# Patient Record
Sex: FEMALE | Race: WHITE | Employment: FULL TIME | URBAN - METROPOLITAN AREA
[De-identification: names, ages, dates, MRNs, and addresses within clinical notes are randomized per-mention and may not be internally consistent; named-entity substitution may affect disease eponyms.]

---

## 2022-12-30 LAB — HBA1C MFR BLD HPLC: 5.1 %

## 2023-01-30 ENCOUNTER — INITIAL PRENATAL (OUTPATIENT)
Dept: OBGYN CLINIC | Age: 33
End: 2023-01-30

## 2023-01-30 VITALS
HEIGHT: 62 IN | WEIGHT: 130 LBS | SYSTOLIC BLOOD PRESSURE: 132 MMHG | DIASTOLIC BLOOD PRESSURE: 89 MMHG | BODY MASS INDEX: 23.92 KG/M2

## 2023-01-30 DIAGNOSIS — Z80.3 FAMILY HISTORY OF BREAST CANCER: ICD-10-CM

## 2023-01-30 DIAGNOSIS — Z3A.08 8 WEEKS GESTATION OF PREGNANCY: Primary | ICD-10-CM

## 2023-01-30 PROBLEM — E03.9 HYPOTHYROID: Status: ACTIVE | Noted: 2023-01-30

## 2023-01-30 LAB
HEP B, EXTERNAL RESULT: NEGATIVE
HEPATITIS C ANTIBODY, EXTERNAL RESULT: NEGATIVE
HIV, EXTERNAL RESULT: NON REACTIVE
RPR, EXTERNAL RESULT: NON REACTIVE
RUBELLA TITER, EXTERNAL RESULT: NORMAL

## 2023-01-30 PROCEDURE — 0501F PRENATAL FLOW SHEET: CPT | Performed by: OBSTETRICS & GYNECOLOGY

## 2023-01-30 RX ORDER — LEVOTHYROXINE AND LIOTHYRONINE 57; 13.5 UG/1; UG/1
TABLET ORAL
COMMUNITY
Start: 2022-08-01

## 2023-01-30 NOTE — PROGRESS NOTES
Current pregnancy history:    Lashanda Damon is a ,  34 y.o. female [address] [facility],[address] Patient's last menstrual period was 2022. .  She presents for the evaluation of amenorrhea and a positive pregnancy test.  Last Pap: see report obtained . LMP history:  The date of her LMP is  certain. Her last menstrual period was normal and lasted for 4 to 5 days. A urine pregnancy test was positive 5 weeks ago. She was not on the pill at conception. Based on her LMP, her EDC is 2022 and her EGA is 8 weeks,4 days. Her menstrual cycles are regular and occur approximately every 28 days  and range from 3 to 5 days. The last menses lasted  the usual number of days. Ultrasound data:  She had an  ultrasound done by the ultrasound tech today which revealed a viable denis pregnancy with a gestational age of 11 weeks and 6 days giving an [address] of 2023. TA ULTRASOUND PERFORMED  A SINGLE VIABLE 8W6D IUP IS SEEN WITH NORMAL CARDIAC RHYTHM. GESTATIONAL AGE BASED ON TODAY'S ULTRASOUND. A NORMAL YOLK SAC IS SEEN. RIGHT OVARY NOT VISUALIZED. RIGHT ADNEXA APPEARS WITHIN NORMAL LIMITS. LEFT OVARY NOT VISUALIZED. LEFT APPEARS WITHIN NORMAL LIMITS. NO FREE FLUID SEEN IN THE CDS. Pregnancy symptoms:    Since her LMP she has experienced  urinary frequency, breast tenderness, and nausea. She has not been vomiting over the last few weeks. She is taking unisom at night with good results  Associated signs and symptoms which she denies: dysuria, discharge, vaginal bleeding. Relevant past pregnancy history:   She has the following pregnancy history: Her last pregnancy was an early loss   She has no history of  delivery. Relevant past medical history:(relevant to this pregnancy): noncontributory. Pap/Occupational history:  Last pap smear: last year Results: Normal      Her occupation is: .     Substance history: negative for alcohol, tobacco and street drugs. Positive for nothing. Exposure history: There is/are no indoor cat/s in the home. The patient was instructed to not change the cat litter. She admits close contact with children on a regular basis. She has had chicken pox or the vaccine in the past.   Patient denies issues with domestic violence. Genetic Screening/Teratology Counseling: (Includes patient, baby's father, or anyone in either family with:)  3.  Patient's age >/= 28 at [facility]?-- no  .   2. Thalassemia ([facility], [facility], [address] [facility] [address] [facility] [address], or  background): MCV<80?--no.     3.  Neural tube defect (meningomyelocele, spina bifida, anencephaly)?--no.   4.  Congenital heart defect?--no.  5.  Down syndrome?--no.   6.  Jaime-Sachs (Shinto, [place])?--no.   7.  Canavan's Disease?--no.   8.  Familial Dysautonomia?--no.   9.  Sickle cell disease or trait ()? --no   The patient has not been tested for sickle trait  10. Hemophilia or other blood disorders?--no. 11.  Muscular dystrophy?--no. 12.  Cystic fibrosis?--no. 13.  Morrow's Chorea?--no. 14.  Mental retardation/autism (if yes was person tested for Fragile X)?--no. 15.  Other inherited genetic or chromosomal disorder?--no. 12.  Maternal metabolic disorder (DM, PKU, etc)?--no. 17.  Patient or FOB with a child with a birth defect not listed above?--no.  17a. Patient or FOB with a birth defect themselves?--no. 18.  Recurrent pregnancy loss, or stillbirth?--no. 19.  Any medications since LMP other than prenatal vitamins (include vitamins, supplements, OTC meds, drugs, alcohol)?--no. 20.  Any other genetic/environmental exposure to discuss?--no. Infection History:  1. Lives with someone with TB or TB exposed?--no.   2.  Patient or partner has history of genital herpes?--no.  3.  Rash or viral illness since LMP?--no.    4.  History of STD (GC, CT, HPV, syphilis, HIV)? --no   5. Other: OTHER?       Past Medical History:   Diagnosis Date    Hypothyroid 2016     History reviewed. No pertinent surgical history. Social History     Occupational History    Not on file   Tobacco Use    Smoking status: Never    Smokeless tobacco: Not on file   Vaping Use    Vaping Use: Never used   Substance and Sexual Activity    Alcohol use: Not Currently     Alcohol/week: 4.0 standard drinks     Types: 4 Glasses of wine per week    Drug use: Never    Sexual activity: Yes     Partners: Male     Birth control/protection: None     Family History   Problem Relation Age of Onset    Breast Cancer Father             Heart Disease Maternal Grandfather     Colon Cancer Paternal Grandfather     Thyroid Disease Sister      OB History    Para Term  AB Living   2 0     1     SAB IAB Ectopic Molar Multiple Live Births   1                # Outcome Date GA Lbr Maksim/2nd Weight Sex Delivery Anes PTL Lv   2 Current            1 2020             Allergies   Allergen Reactions    Influenza Virus Vaccines Anaphylaxis    Azithromycin Nausea and Vomiting     Prior to Admission medications    Medication Sig Start Date End Date Taking? Authorizing Provider   thyroid, Pork, (ARMOUR) 90 mg tablet NP Thyroid 90 mg tablet   TAKE 1 TABLET BY MOUTH EVERY DAY IN THE MORNING ON EMPTY STOMACH 22  Yes Provider, Historical   prenatal vit no.124/iron/folic (PRENATAL VITAMIN PO) Take  by mouth.    Yes Provider, Historical        Review of Systems: History obtained from the patient  Constitutional: negative for weight loss, fever, night sweats  HEENT: negative for hearing loss, earache, congestion, snoring, sore throat  CV: negative for chest pain, palpitations, edema  Resp: negative for cough, shortness of breath, wheezing  Breast: negative for breast lumps, nipple discharge, galactorrhea  GI: negative for change in bowel habits, abdominal pain, black or bloody stools  : negative for frequency, dysuria, hematuria, vaginal discharge  MSK: negative for back pain, joint pain, muscle pain  Skin: negative for itching, rash, hives  Neuro: negative for dizziness, headache, confusion, weakness  Psych: negative for anxiety, depression, change in mood  Heme/lymph: negative for bleeding, bruising, pallor    Objective:  Visit Vitals  /89   Wt 130 lb (59 kg)   LMP 12/01/2022       Physical Exam:     Constitutional  Appearance: well-nourished, well developed, alert, in no acute distress    HENT  Head  Face: appears normal  Eyes: appear normal  Ears: normal  Mouth: normal  Lips: no lesions    Skin  General Inspection: no rash, no lesions identified    Neurologic/Psychiatric  Mental Status:  Orientation: grossly oriented to person, place and time  Mood and Affect: mood normal, affect appropriate    Assessment:     ICD-10-CM ICD-9-CM    1. 8 weeks gestation of pregnancy  Z3A.08 V22.2 TYPE & SCREEN      HEP B SURFACE AG      HIV 1/2 AG/AB, 4TH GENERATION,W RFLX CONFIRM      CBC W/O DIFF      RUBELLA AB, IGG      RPR      HEPATITIS C AB        Intrauterine pregnancy with the following problems identified:   Dating LMP = 8 wk   Hypothyroid    Plan:   Discussed NIPT  Course of pregnancy discussed including visit schedule, routine U/S, glucola testing, etc.  Avoid alcoholic beverages and illicit/recreational drugs use  Take prenatal vitamins or folic acid daily. Hospital and practice style discussed with coverage system. Discussed nutrition, toxoplasmosis precautions, sexual activity, exercise, need for influenza vaccine, environmental and work hazards, travel advice, screen for domestic violence, need for seat belts. Discussed seafood, unpasteurized dairy products, deli meat, artificial sweeteners, and caffeine. Information on prenatal classes/breastfeeding given. Information on circumcision given  Patient encouraged not to smoke. Discussed current prescription drug use. Given medication list.  Discussed the use of over the counter medications and chemicals.   Route of delivery discussed, including risks, benefits  Pt understands risk of hemorrhage during pregnancy and post delivery and would accept blood products if necessary in life-threatening emergencies    Handouts given to pt. Return in about 4 weeks (around 2/27/2023) for Routine OB Visit.

## 2023-01-30 NOTE — PROGRESS NOTES
Alejandro Townsend is a 34 y.o. female presents for a new pregnancy visit. Chief Complaint   Patient presents with    Initial Prenatal Visit         No LMP recorded. Last Pap: see report obtained . LMP history:  The date of her LMP is  certain. Her last menstrual period was normal and lasted for 4 to 5 days. A urine pregnancy test was positive 5 weeks ago. She was not on the pill at conception. Based on her LMP, her EDC is 2022 and her EGA is 8 weeks,4 days. Her menstrual cycles are regular and occur approximately every 28 days  and range from 3 to 5 days. The last menses lasted  the usual number of days. Ultrasound data:  She had an  ultrasound done by the ultrasound tech today which revealed a viable denis pregnancy with a gestational age of 11 weeks and 6 days giving an [address] of 2023. TA ULTRASOUND PERFORMED  A SINGLE VIABLE 8W6D IUP IS SEEN WITH NORMAL CARDIAC RHYTHM. GESTATIONAL AGE BASED ON TODAY'S ULTRASOUND. A NORMAL YOLK SAC IS SEEN. RIGHT OVARY NOT VISUALIZED. RIGHT ADNEXA APPEARS WITHIN NORMAL LIMITS. LEFT OVARY NOT VISUALIZED. LEFT APPEARS WITHIN NORMAL LIMITS. NO FREE FLUID SEEN IN THE CDS. Pregnancy symptoms:    Since her LMP she has experienced  urinary frequency, breast tenderness, and nausea. She has been vomiting over the last few weeks. Associated signs and symptoms which she denies: dysuria, discharge, vaginal bleeding. She states she has gained weight:  Approximately 5 pounds over the last few weeks. Relevant past pregnancy history:   She has the following pregnancy history:     She has no history of  delivery. Relevant past medical history:(relevant to this pregnancy): noncontributory. Her occupation is: [employer]. 1. Have you been to the ER, urgent care clinic, or hospitalized since your last visit? N/A NP    2.  Have you seen or consulted any other health care providers outside of the [address] [place] [address] since your last visit?   N/A NP     Examination chaperoned by Yanira Gallardo MA.

## 2023-02-01 LAB
ABO GROUP BLD: NORMAL
BLD GP AB SCN SERPL QL: NEGATIVE
ERYTHROCYTE [DISTWIDTH] IN BLOOD BY AUTOMATED COUNT: 11.4 % (ref 11.7–15.4)
HBV SURFACE AG SERPL QL IA: NEGATIVE
HCT VFR BLD AUTO: 37 % (ref 34–46.6)
HCV AB S/CO SERPL IA: <0.1 S/CO RATIO (ref 0–0.9)
HGB BLD-MCNC: 12.4 G/DL (ref 11.1–15.9)
HIV 1+2 AB+HIV1 P24 AG SERPL QL IA: NON REACTIVE
MCH RBC QN AUTO: 30.6 PG (ref 26.6–33)
MCHC RBC AUTO-ENTMCNC: 33.5 G/DL (ref 31.5–35.7)
MCV RBC AUTO: 91 FL (ref 79–97)
PLATELET # BLD AUTO: 315 X10E3/UL (ref 150–450)
RBC # BLD AUTO: 4.05 X10E6/UL (ref 3.77–5.28)
RH BLD: POSITIVE
RPR SER QL: NON REACTIVE
RUBV IGG SERPL IA-ACNC: 1.21 INDEX
WBC # BLD AUTO: 11.7 X10E3/UL (ref 3.4–10.8)

## 2023-02-15 ENCOUNTER — PATIENT MESSAGE (OUTPATIENT)
Dept: OBGYN CLINIC | Age: 33
End: 2023-02-15

## 2023-02-15 ENCOUNTER — TELEPHONE (OUTPATIENT)
Dept: OBGYN CLINIC | Age: 33
End: 2023-02-15

## 2023-02-15 NOTE — TELEPHONE ENCOUNTER
34 year ikd  10w6d Pregnant    Patient calling to say that she has [facility] to [facility] for possible uti and would like to see ob Md    Last urine culture came back for strep. .. and patient is wondering about treatment and would like ob to follow     Patient reports history of positive hsv and is wondering if that is going on       Patient placed on the schedule to be seen tomorrow at 4:00pm    Patient verbalized understanding

## 2023-02-16 ENCOUNTER — ROUTINE PRENATAL (OUTPATIENT)
Dept: OBGYN CLINIC | Age: 33
End: 2023-02-16
Payer: COMMERCIAL

## 2023-02-16 VITALS — BODY MASS INDEX: 23.7 KG/M2 | SYSTOLIC BLOOD PRESSURE: 142 MMHG | WEIGHT: 129.6 LBS | DIASTOLIC BLOOD PRESSURE: 88 MMHG

## 2023-02-16 DIAGNOSIS — R82.71 GROUP B STREPTOCOCCAL BACTERIURIA: ICD-10-CM

## 2023-02-16 DIAGNOSIS — L72.3 SEBACEOUS CYST: ICD-10-CM

## 2023-02-16 DIAGNOSIS — N30.00 ACUTE CYSTITIS WITHOUT HEMATURIA: Primary | ICD-10-CM

## 2023-02-16 DIAGNOSIS — B37.31 CANDIDA VAGINITIS: ICD-10-CM

## 2023-02-16 PROCEDURE — 99213 OFFICE O/P EST LOW 20 MIN: CPT | Performed by: OBSTETRICS & GYNECOLOGY

## 2023-02-16 RX ORDER — AMOXICILLIN 500 MG/1
CAPSULE ORAL
COMMUNITY
Start: 2023-02-10

## 2023-02-16 RX ORDER — AMOXICILLIN 500 MG/1
TABLET, FILM COATED ORAL
COMMUNITY
Start: 2023-02-15

## 2023-02-16 RX ORDER — CLOTRIMAZOLE AND BETAMETHASONE DIPROPIONATE 10; .64 MG/G; MG/G
CREAM TOPICAL
Qty: 15 G | Refills: 0 | Status: SHIPPED | OUTPATIENT
Start: 2023-02-16 | End: 2023-02-26

## 2023-02-16 RX ORDER — METRONIDAZOLE 7.5 MG/G
GEL VAGINAL
COMMUNITY
Start: 2023-02-03

## 2023-02-16 RX ORDER — LEVOTHYROXINE, LIOTHYRONINE 19; 4.5 UG/1; UG/1
TABLET ORAL
COMMUNITY
Start: 2023-02-03

## 2023-02-16 NOTE — PROGRESS NOTES
VAGINITIS EVALUATION    Chief Complaint   Pregnancy Problem    HPI  34 y.o.  female complains of UTI and vaginal irritation. Also small bump on right labia. Denies discharge. Was seen at [facility] and had urine culture that grew out GBS (50k). Was given amoxicillin but didn't take it yet. No VB. Past Medical History:  Patient Active Problem List   Diagnosis Code    Acquired hypothyroidism E03.9    Family history of breast cancer Z80.3    Group B streptococcal bacteriuria R82.71    Genital herpes A60.00       Past Medical History:   Diagnosis Date    Genital herpes     Group B streptococcal bacteriuria 2023    Hypothyroidism 2016     History reviewed. No pertinent surgical history.   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   Tobacco Use    Smoking status: Never    Smokeless tobacco: Never   Vaping Use    Vaping Use: Never used   Substance and Sexual Activity    Alcohol use: Not Currently     Alcohol/week: 4.0 standard drinks     Types: 4 Glasses of wine per week    Drug use: Never    Sexual activity: Yes     Partners: Male     Birth control/protection: None      Family History   Problem Relation Age of Onset    Breast Cancer Father 48           Was not brca tested    Thyroid Disease Sister     Heart Disease Maternal Grandfather     Colon Cancer Paternal Grandfather      Current Outpatient Medications on File Prior to Visit   Medication Sig Dispense Refill    amoxicillin (AMOXIL) 500 mg capsule       amoxicillin (AMOXIL) 500 mg tablet       metroNIDAZOLE (METROGEL) 0.75 % (37.5mg/5 gram) gel       NP Thyroid 30 mg tablet       thyroid, Pork, (ARMOUR) 90 mg tablet NP Thyroid 90 mg tablet   TAKE 1 TABLET BY MOUTH EVERY DAY IN THE MORNING ON EMPTY STOMACH      prenatal vit no.124/iron/folic (PRENATAL VITAMIN PO) Take  by mouth. No current facility-administered medications on file prior to visit. Allergies   Allergen Reactions    Influenza Virus Vaccines Anaphylaxis    Azithromycin Nausea and Vomiting     Other reaction(s): GI Intolerance       Review of Systems:   History obtained from the patient-negative for:  Constitutional: weight loss, fever, night sweats  HEENT: hearing loss, earache, congestion, snoring, sorethroat  CV: chest pain, palpitations, edema  Resp: cough, shortness of breath, wheezing  Breast: breast lumps, nipple discharge, galactorrhea  GI: change in bowel habits, abdominal pain, black or bloody stools  : frequency, dysuria, hematuria  MSK: back pain, joint pain, muscle pain  Skin: itching, rash, hives  Neuro: dizziness, headache, confusion, weakness  Psych: anxiety, depression, change in mood  Heme/lymph: bleeding, bruising, pallor    Objective:  Visit Vitals  BP (!) 142/88   Wt 129 lb 9.6 oz (58.8 kg)   LMP 12/01/2022   BMI 23.70 kg/m²       Physical Exam:  Constitutional  Appearance: well-nourished, well developed, alert, in no acute distress    HENT  Head and Face: appears normal    Genitourinary  External Genitalia: normal appearance for age, no discharge present, no tenderness present, a small sebaceous cyst is noted on the labia majora on the right. Not  inflamed. No other lesions present, no masses present, no atrophy present  Vagina:  no discharge present, otherwise normal vaginal vault without central or paravaginal defects, inflammation around posterior fourchette & perineum.  No  lesions present, no masses present  Bladder: non-tender to palpation  Urethra: appears normal  Cervix: normal   Uterus: normal size, shape and consistency  Adnexa: no adnexal tenderness present, no adnexal masses present  Perineum: perineum within normal limits, no evidence of trauma, no rashes or skin lesions present  Anus: anus within normal limits, no hemorrhoids present  Inguinal Lymph Nodes: no lymphadenopathy present    Skin  General Inspection: no rash, no lesions identified    Neurologic/Psychiatric  Mental Status:  Orientation: grossly oriented to person, place and time  Mood and Affect: mood normal, affect appropriate    Labs:  No results found for this or any previous visit (from the past 24 hour(s)). Assessment:     ICD-10-CM ICD-9-CM    1. Acute cystitis without hematuria  N30.00 595.0       2. Group B streptococcal bacteriuria  R82.71 599.0      041.02       3. Candida vaginitis  B37.31 112.1           Plan:   Treatment:  Lotrisone  Take Amoxicillin  Return in about 12 days (around 2/28/2023) for Routine OB Visit. ROV prn if symptoms persist or worsen.

## 2023-02-16 NOTE — PROGRESS NOTES
Patient states she was seen at [address] 2/7/2023 did vaginitis panel negative also seen at [facility] 2/10/2023 did urine culture. Patient has results. Patient states there is a boil?in her vaginal area.

## 2023-02-21 ENCOUNTER — ROUTINE PRENATAL (OUTPATIENT)
Dept: OBGYN CLINIC | Age: 33
End: 2023-02-21
Payer: COMMERCIAL

## 2023-02-21 VITALS
SYSTOLIC BLOOD PRESSURE: 122 MMHG | DIASTOLIC BLOOD PRESSURE: 85 MMHG | WEIGHT: 130.8 LBS | BODY MASS INDEX: 24.07 KG/M2 | HEIGHT: 62 IN

## 2023-02-21 DIAGNOSIS — N94.89 VULVAR BURNING: Primary | ICD-10-CM

## 2023-02-21 PROCEDURE — 99213 OFFICE O/P EST LOW 20 MIN: CPT | Performed by: OBSTETRICS & GYNECOLOGY

## 2023-02-21 NOTE — PROGRESS NOTES
VAGINITIS EVALUATION    Chief Complaint   Pregnancy Problem    HPI  34 y.o.  female complains of vulvar burning. She has been on amoxicillin for strept viridans but almost done. Was given Rx for Lotrisone at last visit but it burned to use so she stopped. Denies Vaginal discharge. States burning every where labia, periclitoral, perineum. No specific lesions. Is also taking valtrex just to be sure. Past Medical History:  Patient Active Problem List   Diagnosis Code    Acquired hypothyroidism E03.9    Family history of breast cancer Z80.3    Group B streptococcal bacteriuria R82.71    Genital herpes A60.00     Past Medical History:   Diagnosis Date    Genital herpes     had a few minor outbreaks and never after    Group B streptococcal bacteriuria 2023    Hypothyroidism 2016     History reviewed. No pertinent surgical history.   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   Tobacco Use    Smoking status: Never    Smokeless tobacco: Never   Vaping Use    Vaping Use: Never used   Substance and Sexual Activity    Alcohol use: Not Currently     Alcohol/week: 4.0 standard drinks     Types: 4 Glasses of wine per week    Drug use: Never    Sexual activity: Yes     Partners: Male     Birth control/protection: None      Family History   Problem Relation Age of Onset    Breast Cancer Father 48           Was not brca tested    Thyroid Disease Sister     Heart Disease Maternal Grandfather     Colon Cancer Paternal Grandfather      Current Outpatient Medications on File Prior to Visit   Medication Sig Dispense Refill    amoxicillin (AMOXIL) 500 mg capsule       amoxicillin (AMOXIL) 500 mg tablet       metroNIDAZOLE (METROGEL) 0.75 % (37.5mg/5 gram) gel       NP Thyroid 30 mg tablet       clotrimazole-betamethasone (LOTRISONE) topical cream Apply to affected areas externally bid. 15 g 0    thyroid, Pork, (ARMOUR) 90 mg tablet NP Thyroid 90 mg tablet   TAKE 1 TABLET BY MOUTH EVERY DAY IN THE MORNING ON EMPTY STOMACH      prenatal vit no.124/iron/folic (PRENATAL VITAMIN PO) Take  by mouth. No current facility-administered medications on file prior to visit.      Allergies   Allergen Reactions    Influenza Virus Vaccines Anaphylaxis    Azithromycin Nausea and Vomiting     Other reaction(s): GI Intolerance       Review of Systems:   History obtained from the patient-negative for:  Constitutional: weight loss, fever, night sweats  HEENT: hearing loss, earache, congestion, snoring, sorethroat  CV: chest pain, palpitations, edema  Resp: cough, shortness of breath, wheezing  Breast: breast lumps, nipple discharge, galactorrhea  GI: change in bowel habits, abdominal pain, black or bloody stools  : frequency, dysuria, hematuria  MSK: back pain, joint pain, muscle pain  Skin: itching, rash, hives  Neuro: dizziness, headache, confusion, weakness  Psych: anxiety, depression, change in mood  Heme/lymph: bleeding, bruising, pallor    Objective:  Visit Vitals  /85   Ht 5' 2\" (1.575 m)   Wt 130 lb 12.8 oz (59.3 kg)   LMP 12/01/2022   BMI 23.92 kg/m²       Physical Exam:  Constitutional  Appearance: well-nourished, well developed, alert, in no acute distress    HENT  Head and Face: appears normal    Genitourinary  External Genitalia: normal appearance for age, no discharge present, no tenderness present, no inflammatory lesions present, no masses present, no atrophy present  Vagina:  no discharge present, otherwise normal vaginal vault without central or paravaginal defects, no inflammatory lesions present, no masses present  Bladder: non-tender to palpation  Urethra: appears normal  Cervix: normal   Uterus: normal size, shape and consistency  Adnexa: no adnexal tenderness present, no adnexal masses present  Perineum: perineum within normal limits, no evidence of trauma, no rashes or skin lesions present  Anus: anus within normal limits, no hemorrhoids present  Inguinal Lymph Nodes: no lymphadenopathy present    Skin  General Inspection: no rash, no lesions identified    Neurologic/Psychiatric  Mental Status:  Orientation: grossly oriented to person, place and time  Mood and Affect: mood normal, affect appropriate    Labs:  Urine Dip negative. Wet Prep -- neg clues, wbc, trich or yeast on KOH    Assessment:     ICD-10-CM ICD-9-CM    1. Vulvar burning  N94.89 625.9 CT/NG/T.VAGINALIS AMPLIFICATION          Plan:   Treatment:  A&D ointment  Return in about 1 week (around 2/28/2023) for Routine OB Visit. ROV prn if symptoms persist or worsen.

## 2023-02-28 ENCOUNTER — ROUTINE PRENATAL (OUTPATIENT)
Dept: OBGYN CLINIC | Age: 33
End: 2023-02-28
Payer: COMMERCIAL

## 2023-02-28 VITALS — WEIGHT: 131.2 LBS | DIASTOLIC BLOOD PRESSURE: 83 MMHG | BODY MASS INDEX: 24 KG/M2 | SYSTOLIC BLOOD PRESSURE: 125 MMHG

## 2023-02-28 DIAGNOSIS — Z3A.12 12 WEEKS GESTATION OF PREGNANCY: Primary | ICD-10-CM

## 2023-02-28 PROCEDURE — 0502F SUBSEQUENT PRENATAL CARE: CPT | Performed by: OBSTETRICS & GYNECOLOGY

## 2023-02-28 NOTE — PROGRESS NOTES
OB VISIT      Current EGA:   12w5d    Visit Notes:  Doing Well. +Fetal Movement. No Ucs. No LOF or VB. For NIPT & Horizon today  Total weight gain is 1 lb 3.2 oz (0.544 kg). Problem List:  Dating LMP = 8 wk US  Hypothyroid  GBS Pos urcx  HSV2 +  Valtrex 35w    Visit Diagnoses:    ICD-10-CM ICD-9-CM    1. 12 weeks gestation of pregnancy  Z3A.12 V22.2 MISC. LAB TEST      MISC. LAB TEST        Follow Up:  Return in about 4 weeks (around 3/28/2023) for Routine OB Visit.     Lauri Lo MD  02/28/23  9:37 AM

## 2023-03-28 ENCOUNTER — ROUTINE PRENATAL (OUTPATIENT)
Dept: OBGYN CLINIC | Age: 33
End: 2023-03-28
Payer: COMMERCIAL

## 2023-03-28 VITALS — DIASTOLIC BLOOD PRESSURE: 75 MMHG | WEIGHT: 138.8 LBS | BODY MASS INDEX: 25.39 KG/M2 | SYSTOLIC BLOOD PRESSURE: 116 MMHG

## 2023-03-28 DIAGNOSIS — Z3A.16 16 WEEKS GESTATION OF PREGNANCY: Primary | ICD-10-CM

## 2023-03-28 PROCEDURE — 0502F SUBSEQUENT PRENATAL CARE: CPT | Performed by: OBSTETRICS & GYNECOLOGY

## 2023-03-28 RX ORDER — LEVOTHYROXINE, LIOTHYRONINE 76; 18 UG/1; UG/1
TABLET ORAL
COMMUNITY
Start: 2023-03-01

## 2023-03-28 NOTE — PROGRESS NOTES
OB VISIT      Current EGA:   16w5d    Visit Notes:  Doing Well. No LOF or VB. Total weight gain is 8 lb 12.8 oz (3.992 kg). Problem List:  Dating LMP = 8 wk US  Hypothyroid  GBS Pos urcx  HSV2 +  Valtrex 35w  NIPT WNL  Girl     Visit Diagnoses:    ICD-10-CM ICD-9-CM    1. 16 weeks gestation of pregnancy  Z3A.16 V22.2 AFP, MATERNAL SCREEN        Follow Up:  Return in about 1 month (around 4/28/2023) for Routine OB Visit, OB Ultrasound.     Junie Moore MD  03/28/23  9:30 AM

## 2023-03-30 LAB
AFP INTERP SERPL-IMP: NORMAL
AFP INTERP SERPL-IMP: NORMAL
AFP MOM SERPL: 0.95
AFP SERPL-MCNC: 34.8 NG/ML
AGE AT DELIVERY: 34 YR
COMMENT, 018013: NORMAL
GA METHOD: NORMAL
GA: 16 WEEKS
IDDM PATIENT QL: NO
MULTIPLE PREGNANCY: NO
NEURAL TUBE DEFECT RISK FETUS: NORMAL %
RESULTS, 017004: NORMAL

## 2023-04-17 ENCOUNTER — PATIENT MESSAGE (OUTPATIENT)
Dept: OBGYN CLINIC | Age: 33
End: 2023-04-17

## 2023-04-17 DIAGNOSIS — N76.0 BV (BACTERIAL VAGINOSIS): Primary | ICD-10-CM

## 2023-04-17 DIAGNOSIS — B96.89 BV (BACTERIAL VAGINOSIS): Primary | ICD-10-CM

## 2023-04-17 RX ORDER — VALACYCLOVIR HYDROCHLORIDE 500 MG/1
500 TABLET, FILM COATED ORAL 2 TIMES DAILY
Qty: 20 TABLET | Refills: 3 | Status: SHIPPED | OUTPATIENT
Start: 2023-04-17 | End: 2023-04-27

## 2023-04-19 RX ORDER — METRONIDAZOLE 500 MG/1
500 TABLET ORAL 2 TIMES DAILY
Qty: 14 TABLET | Refills: 0 | Status: SHIPPED | OUTPATIENT
Start: 2023-04-19 | End: 2023-04-26

## 2023-04-19 NOTE — TELEPHONE ENCOUNTER
[place]ong [place], [place] 4/18/2023 10:02 AM EDT    See message. ----- Message -----  From: Radha Enriquez  Sent: 4/17/2023 8:23 PM EDT  To: [facility]  Subject: Irritation       Dr. Dayl Rosales again for another message! I dont like the be that annoying patient. I was having that irritation and pressure back this weekend and [facility]. They did [facility]ab and it came back positive for Atopobium BV. Im taking probiotics but I really think I need an antibiotic because I am super  Uncomfortable and its gotten worse as the day has gone on. Maybe these crazy hormones :) What can I do for this? Thanks!   Annalisa Barone

## 2023-04-25 ENCOUNTER — ROUTINE PRENATAL (OUTPATIENT)
Dept: OBGYN CLINIC | Age: 33
End: 2023-04-25

## 2023-04-25 VITALS — DIASTOLIC BLOOD PRESSURE: 81 MMHG | BODY MASS INDEX: 26.19 KG/M2 | SYSTOLIC BLOOD PRESSURE: 134 MMHG | WEIGHT: 143.2 LBS

## 2023-04-25 DIAGNOSIS — Z3A.20 20 WEEKS GESTATION OF PREGNANCY: Primary | ICD-10-CM

## 2023-04-25 PROCEDURE — 0502F SUBSEQUENT PRENATAL CARE: CPT | Performed by: OBSTETRICS & GYNECOLOGY

## 2023-04-25 NOTE — PROGRESS NOTES
OB VISIT      Current EGA:   20w5d    Visit Notes:  Doing Well. +Fetal Movement. No Ucs. No LOF or VB. US today wnl  Growth 92%    Visit Vitals  /81   Wt 143 lb 3.2 oz (65 kg)   BMI 26.19 kg/m²     Total weight gain is 13 lb 3.2 oz (5.987 kg). Problem List:  Dating LMP = 8 wk US  Hypothyroid  GBS Pos urcx  HSV2 +  Valtrex 35w  NIPT WNL  Girl \"Lux\"  US 20w5d = 21w4d 92%  Macrosomia -- rescan 32w     Visit Diagnoses:    ICD-10-CM ICD-9-CM    1. 20 weeks gestation of pregnancy  Z3A.20 V22.2         Follow Up:  Return in about 4 weeks (around 5/23/2023).     Keith Sánchez MD  04/25/23  9:28 AM

## 2023-04-25 NOTE — PROGRESS NOTES
STANDARD FETAL ANATOMIC SURVEY  A SINGLE VIABLE IUP AT 20W5D GA BY LMP IS SEEN. FETAL CARDIAC MOTION OBSERVED. FETAL ANATOMY WELL VISUALIZED AND APPEARS WITHIN NORMAL LIMITS. NO ABNORMALITIES ARE SEEN ON TODAY'S EXAM.  APPROPRIATE FETAL GROWTH IS SEEN. SIZE=DATES. HOMA, CERVIX AND PLACENTA APPEAR WITHIN NORMAL LIMITS.   GENDER: XX

## 2023-05-18 RX ORDER — METRONIDAZOLE 7.5 MG/G
GEL VAGINAL
COMMUNITY
Start: 2023-04-17

## 2023-05-18 RX ORDER — LEVOTHYROXINE, LIOTHYRONINE 76; 18 UG/1; UG/1
TABLET ORAL
COMMUNITY
Start: 2023-03-01

## 2023-05-18 RX ORDER — CLOTRIMAZOLE AND BETAMETHASONE DIPROPIONATE 10; .64 MG/G; MG/G
CREAM TOPICAL
COMMUNITY
Start: 2023-02-16

## 2023-05-23 ENCOUNTER — ROUTINE PRENATAL (OUTPATIENT)
Age: 33
End: 2023-05-23

## 2023-05-23 VITALS — WEIGHT: 154.6 LBS | SYSTOLIC BLOOD PRESSURE: 123 MMHG | DIASTOLIC BLOOD PRESSURE: 81 MMHG | BODY MASS INDEX: 28.28 KG/M2

## 2023-05-23 DIAGNOSIS — O99.012 ANEMIA AFFECTING PREGNANCY IN SECOND TRIMESTER: ICD-10-CM

## 2023-05-23 DIAGNOSIS — Z3A.24 24 WEEKS GESTATION OF PREGNANCY: Primary | ICD-10-CM

## 2023-05-23 PROBLEM — O99.019 ANEMIA AFFECTING PREGNANCY: Status: ACTIVE | Noted: 2023-05-23

## 2023-05-23 PROCEDURE — 0502F SUBSEQUENT PRENATAL CARE: CPT | Performed by: OBSTETRICS & GYNECOLOGY

## 2023-05-23 RX ORDER — PRENATAL VIT 91/IRON/FOLIC/DHA 28-975-200
COMBINATION PACKAGE (EA) ORAL
COMMUNITY

## 2023-05-23 NOTE — PROGRESS NOTES
OB VISIT      Current EGA:   24w5d    Visit Notes:  Doing Well. +Fetal Movement. No Ucs. No LOF or VB. Some umbilical pain last week seems better now. Total weight gain is 24 lb 9.6 oz (11.2 kg). (Total expected for pregnancy is 25 lb (11.5 kg)-35 lb (16 kg))    Problem List:  Dating LMP = 8 wk US  Hypothyroid - sees PCP regular checks q month  GBS Pos urcx  HSV2 +  Valtrex 35w  NIPT WNL  Girl \"Lux\"  US 20w5d = 21w4d 92%  Macrosomia -- rescan 32w   Anemia - 10.8  Slow Fe    Visit Diagnoses:   Diagnosis Orders   1. 24 weeks gestation of pregnancy          CURRENT MEDS W/ ASSOC DIAG           Start Date End Date     clotrimazole-betamethasone (LOTRISONE) 1-0.05 % cream  02/16/23  --     Associated Diagnoses:  --     metroNIDAZOLE (METROGEL) 0.75 % vaginal gel  04/17/23  --     Associated Diagnoses:  --     NP THYROID 120 MG tablet  03/01/23  --     Associated Diagnoses:  --     Prenatal MV-Min-Fe Fum-FA-DHA (PRENATAL+DHA) 28-0.975 & 200 MG MISC  --  --     Associated Diagnoses:  --             Follow Up:  Return in about 3 weeks (around 6/13/2023) for Routine OB, Glucola.     Johnny Cunha MD  05/23/23  9:30 AM

## 2023-06-15 PROBLEM — R73.09 ELEVATED GLUCOSE TOLERANCE TEST: Status: ACTIVE | Noted: 2023-06-15

## 2023-07-11 ENCOUNTER — ROUTINE PRENATAL (OUTPATIENT)
Age: 33
End: 2023-07-11

## 2023-07-11 VITALS — DIASTOLIC BLOOD PRESSURE: 74 MMHG | WEIGHT: 165.2 LBS | SYSTOLIC BLOOD PRESSURE: 120 MMHG | BODY MASS INDEX: 30.22 KG/M2

## 2023-07-11 DIAGNOSIS — Z3A.31 31 WEEKS GESTATION OF PREGNANCY: Primary | ICD-10-CM

## 2023-07-11 PROCEDURE — 0502F SUBSEQUENT PRENATAL CARE: CPT | Performed by: OBSTETRICS & GYNECOLOGY

## 2023-07-11 RX ORDER — VALACYCLOVIR HYDROCHLORIDE 500 MG/1
500 TABLET, FILM COATED ORAL DAILY
Qty: 30 TABLET | Refills: 5 | Status: SHIPPED | OUTPATIENT
Start: 2023-07-11 | End: 2024-01-07

## 2023-07-11 NOTE — PROGRESS NOTES
OB VISIT      Current EGA:   31w5d    Visit Notes:  Doing Well. +Fetal Movement. No Ucs. No LOF or VB. Some left hip pain and joint pain in fingers. Turmeric advised    Total weight gain is 35 lb 3.2 oz (16 kg). (Total expected for pregnancy is 25 lb (11.5 kg)-35 lb (16 kg))  Last Weight Metrics:  Weight Loss Metrics 7/11/2023 6/13/2023 5/23/2023 4/25/2023 3/28/2023 2/28/2023 2/21/2023   Height - - - - - - 5' 2\"   Weight 165 lbs 3 oz 157 lbs 13 oz 154 lbs 10 oz 143 lbs 3 oz 138 lbs 13 oz 131 lbs 3 oz 130 lbs 13 oz   BMI (Calculated) 0 kg/m2 0 kg/m2 0 kg/m2 0 kg/m2 0 kg/m2 0 kg/m2 24 kg/m2        Vitals:    07/11/23 0913   BP: 120/74      Problem List:  Dating LMP = 8 wk US  Hypothyroid - sees PCP regular checks q month  GBS Pos urcx  HSV2 +  Valtrex 35w daily  NIPT WNL  Girl \"Lux\"  US 20w5d = 21w4d 92%  Macrosomia -- rescan 32w  (pt terrified of vaginal delivery)  Anemia - 10.8  Slow Fe  Sciatica  Rectus diastasis  Seeing PT    Current Outpatient Medications   Medication Instructions    NP THYROID 15 MG tablet No dose, route, or frequency recorded. Prenatal MV-Min-Fe Fum-FA-DHA (PRENATAL+DHA) 28-0.975 & 200 MG MISC Oral        Visit Diagnoses:   Diagnosis Orders   1. 31 weeks gestation of pregnancy            Follow Up:  Return in about 2 weeks (around 7/25/2023) for Routine OB, OB Ultrasound.     Tianna Hamilton MD, FACOG  07/11/23  9:30 AM

## 2023-07-26 ENCOUNTER — PATIENT MESSAGE (OUTPATIENT)
Age: 33
End: 2023-07-26

## 2023-07-27 PROBLEM — K64.4 EXTERNAL HEMORRHOID: Status: ACTIVE | Noted: 2023-07-27

## 2023-07-27 RX ORDER — DIAPER,BRIEF,INFANT-TODD,DISP
EACH MISCELLANEOUS
Qty: 30 G | Refills: 1 | Status: SHIPPED | OUTPATIENT
Start: 2023-07-27 | End: 2023-08-03

## 2023-08-01 ENCOUNTER — ROUTINE PRENATAL (OUTPATIENT)
Age: 33
End: 2023-08-01

## 2023-08-01 VITALS — WEIGHT: 169.4 LBS | DIASTOLIC BLOOD PRESSURE: 88 MMHG | BODY MASS INDEX: 30.98 KG/M2 | SYSTOLIC BLOOD PRESSURE: 135 MMHG

## 2023-08-01 DIAGNOSIS — Z3A.34 34 WEEKS GESTATION OF PREGNANCY: Primary | ICD-10-CM

## 2023-08-01 PROCEDURE — 0502F SUBSEQUENT PRENATAL CARE: CPT | Performed by: OBSTETRICS & GYNECOLOGY

## 2023-08-01 RX ORDER — LEVOTHYROXINE, LIOTHYRONINE 76; 18 UG/1; UG/1
TABLET ORAL
COMMUNITY
Start: 2023-07-10 | End: 2023-08-01 | Stop reason: SDUPTHER

## 2023-08-01 NOTE — PROGRESS NOTES
OB VISIT      Current EGA:   34w5d    Visit Notes:  Doing Well. +Fetal Movement. No Ucs. No LOF or VB. Had [address] today  growth @77% had = 8'12\" @40w  Pelvic pressure Cx L/C    Total weight gain is 39 lb 6.4 oz (17.9 kg). (Total expected for pregnancy is 25 lb (11.5 kg)-35 lb (16 kg))  Last Weight Metrics:  Weight Loss Metrics 8/1/2023 7/11/2023 6/13/2023 5/23/2023 4/25/2023 3/28/2023 2/28/2023   Height - - - - - - -   Weight 169 lbs 6 oz 165 lbs 3 oz 157 lbs 13 oz 154 lbs 10 oz 143 lbs 3 oz 138 lbs 13 oz 131 lbs 3 oz   BMI (Calculated) 0 kg/m2 0 kg/m2 0 kg/m2 0 kg/m2 0 kg/m2 0 kg/m2 0 kg/m2        Vitals:    08/01/23 1006   BP: 135/88        Problem List:  Dating LMP = 8 wk US  Hypothyroid - sees PCP regular checks q month  GBS Pos urcx  HSV2 +  Valtrex 35w daily  NIPT WNL  Girl \"Lux\"  US 20w5d = 21w4d 92%  Macrosomia -- rescan 32w  (pt terrified of vaginal delivery). Resolved @34 wk 77%  Anemia - 10.8  Slow Fe  Sciatica  Rectus diastasis  Seeing PT  US 34w5d = 35w6d growth 77% had  Desires primary CS Terrified of VB. Increase risks reviewed at length  7/31 12noon? Current Outpatient Medications   Medication Instructions    hydrocortisone (ALA-CORINNA) 1 % cream Apply topically 2 times daily. Prenatal MV-Min-Fe Fum-FA-DHA (PRENATAL+DHA) 28-0.975 & 200 MG MISC Oral    valACYclovir (VALTREX) 500 mg, Oral, DAILY        Visit Diagnoses:   Diagnosis Orders   1. 34 weeks gestation of pregnancy            Follow Up:  Return in about 1 week (around 8/8/2023) for Routine OB.     Naif Gutierrez MD, FACOG  08/01/23  10:21 AM

## 2023-08-01 NOTE — PROGRESS NOTES
LIMITED OB SCAN  A SINGLE VERTEX 34W5D IUP IS SEEN. FETAL CARDIAC MOTION OBSERVED. LIMITED ANATOMY WAS VISUALIZED AND APPEARS WNL. APPROPRIATE FETAL GROWTH IS SEEN. SIZE=DATES. FL > 90%  CECILE AND PLACENTA APPEAR WITHIN NORMAL LIMITS.

## 2023-08-08 ENCOUNTER — ROUTINE PRENATAL (OUTPATIENT)
Age: 33
End: 2023-08-08

## 2023-08-08 VITALS — WEIGHT: 171.6 LBS | SYSTOLIC BLOOD PRESSURE: 137 MMHG | DIASTOLIC BLOOD PRESSURE: 94 MMHG | BODY MASS INDEX: 31.39 KG/M2

## 2023-08-08 DIAGNOSIS — Z3A.35 35 WEEKS GESTATION OF PREGNANCY: Primary | ICD-10-CM

## 2023-08-08 PROCEDURE — 0502F SUBSEQUENT PRENATAL CARE: CPT | Performed by: OBSTETRICS & GYNECOLOGY

## 2023-08-08 SDOH — ECONOMIC STABILITY: TRANSPORTATION INSECURITY
IN THE PAST 12 MONTHS, HAS LACK OF TRANSPORTATION KEPT YOU FROM MEETINGS, WORK, OR FROM GETTING THINGS NEEDED FOR DAILY LIVING?: NO

## 2023-08-08 SDOH — ECONOMIC STABILITY: HOUSING INSECURITY
IN THE LAST 12 MONTHS, WAS THERE A TIME WHEN YOU DID NOT HAVE A STEADY PLACE TO SLEEP OR SLEPT IN A SHELTER (INCLUDING NOW)?: NO

## 2023-08-08 SDOH — ECONOMIC STABILITY: FOOD INSECURITY: WITHIN THE PAST 12 MONTHS, YOU WORRIED THAT YOUR FOOD WOULD RUN OUT BEFORE YOU GOT MONEY TO BUY MORE.: PATIENT DECLINED

## 2023-08-08 SDOH — ECONOMIC STABILITY: FOOD INSECURITY: WITHIN THE PAST 12 MONTHS, THE FOOD YOU BOUGHT JUST DIDN'T LAST AND YOU DIDN'T HAVE MONEY TO GET MORE.: SOMETIMES TRUE

## 2023-08-08 SDOH — ECONOMIC STABILITY: INCOME INSECURITY: HOW HARD IS IT FOR YOU TO PAY FOR THE VERY BASICS LIKE FOOD, HOUSING, MEDICAL CARE, AND HEATING?: SOMEWHAT HARD

## 2023-08-08 NOTE — PROGRESS NOTES
Patient states feels like she has UTI. Had some amoxicillin leftover and starting taking it. Cancelled GBS urine had  showed positive previously.

## 2023-08-08 NOTE — PROGRESS NOTES
OB VISIT      Current EGA:   35w5d    Visit Notes:  Doing Well. +Fetal Movement. No Ucs. No LOF or VB. No sx pre e. DTR 2+/4 no clonus. UA neg prot    Total weight gain is 41 lb 9.6 oz (18.9 kg). 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 Resolved @34 wk 77%  Anemia - 10.8  Slow Fe  Sciatica  Rectus diastasis  Seeing PT  US 34w5d = 35w6d growth 77% had  Desires primary CS Terrified of VB. Increase risks reviewed at length  8/31 12noon       Current Outpatient Medications   Medication Instructions    Prenatal MV-Min-Fe Fum-FA-DHA (PRENATAL+DHA) 28-0.975 & 200 MG MISC Oral    valACYclovir (VALTREX) 500 mg, Oral, DAILY        Visit Diagnoses:   Diagnosis Orders   1. 35 weeks gestation of pregnancy            Follow Up:  Return in about 1 week (around 8/15/2023) for Routine OB.   Check Bps at home call if > 140/90 persistently or Pre E sx  Cristela Leggett MD, FACOG  08/08/23  1:20 PM

## 2023-08-17 ENCOUNTER — ROUTINE PRENATAL (OUTPATIENT)
Age: 33
End: 2023-08-17

## 2023-08-17 VITALS — BODY MASS INDEX: 31.97 KG/M2 | SYSTOLIC BLOOD PRESSURE: 134 MMHG | DIASTOLIC BLOOD PRESSURE: 87 MMHG | WEIGHT: 174.8 LBS

## 2023-08-17 DIAGNOSIS — Z3A.37 37 WEEKS GESTATION OF PREGNANCY: Primary | ICD-10-CM

## 2023-08-17 PROCEDURE — 0502F SUBSEQUENT PRENATAL CARE: CPT | Performed by: OBSTETRICS & GYNECOLOGY

## 2023-08-17 RX ORDER — LEVOTHYROXINE, LIOTHYRONINE 76; 18 UG/1; UG/1
TABLET ORAL
COMMUNITY
Start: 2023-08-14

## 2023-08-17 RX ORDER — LEVOTHYROXINE, LIOTHYRONINE 9.5; 2.25 UG/1; UG/1
TABLET ORAL
COMMUNITY
Start: 2023-08-14 | End: 2023-08-17 | Stop reason: SDUPTHER

## 2023-08-17 NOTE — PROGRESS NOTES
OB VISIT      Current EGA:   37w0d    Visit Notes:  Doing Well. +Fetal Movement. No Ucs. No LOF or VB. Checking BP's  running at baseline 130/90 at home    Total weight gain is 41 lb 9.6 oz (18.9 kg). (Total expected for pregnancy is 25 lb (11.5 kg)-35 lb (16 kg))  Last Weight Metrics:  Weight Loss Metrics 8/8/2023 8/1/2023 7/11/2023 6/13/2023 5/23/2023 4/25/2023 3/28/2023   Height - - - - - - -   Weight 171 lbs 10 oz 169 lbs 6 oz 165 lbs 3 oz 157 lbs 13 oz 154 lbs 10 oz 143 lbs 3 oz 138 lbs 13 oz   BMI (Calculated) 0 kg/m2 0 kg/m2 0 kg/m2 0 kg/m2 0 kg/m2 0 kg/m2 0 kg/m2        There were no vitals filed for this visit. 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 Resolved @34 wk 77%  Anemia - 10.8  Slow Fe  Sciatica  Rectus diastasis  Seeing PT  US 34w5d = 35w6d growth 77% had  Desires primary CS Terrified of VB. Increase risks reviewed at length  8/31 12noon       Current Outpatient Medications   Medication Instructions    NP THYROID 120 MG tablet No dose, route, or frequency recorded. Prenatal MV-Min-Fe Fum-FA-DHA (PRENATAL+DHA) 28-0.975 & 200 MG MISC Oral    valACYclovir (VALTREX) 500 mg, Oral, DAILY        Visit Diagnoses:   Diagnosis Orders   1. 37 weeks gestation of pregnancy            Follow Up:  Return in about 1 week (around 8/24/2023) for Routine OB.     Ren Rush MD, FACOG  08/17/23  12:21 PM

## 2023-08-22 ENCOUNTER — ROUTINE PRENATAL (OUTPATIENT)
Age: 33
End: 2023-08-22

## 2023-08-22 VITALS — DIASTOLIC BLOOD PRESSURE: 89 MMHG | BODY MASS INDEX: 31.9 KG/M2 | WEIGHT: 174.4 LBS | SYSTOLIC BLOOD PRESSURE: 139 MMHG

## 2023-08-22 DIAGNOSIS — Z3A.37 37 WEEKS GESTATION OF PREGNANCY: Primary | ICD-10-CM

## 2023-08-22 PROCEDURE — 0502F SUBSEQUENT PRENATAL CARE: CPT | Performed by: OBSTETRICS & GYNECOLOGY

## 2023-08-22 NOTE — PROGRESS NOTES
OB VISIT      Current EGA:   37w5d    Visit Notes:  Doing Well. +Fetal Movement. No Ucs. No LOF or VB. Blood pressures stable and similar to BP here. No sx Pre E. No Edema except end of day. Crampy some. Total weight gain is 44 lb 6.4 oz (20.1 kg). (Total expected for pregnancy is 25 lb (11.5 kg)-35 lb (16 kg))  Last Weight Metrics:  Weight Loss Metrics 2023   Height - - - - - - -   Weight 174 lbs 6 oz 174 lbs 13 oz 171 lbs 10 oz 169 lbs 6 oz 165 lbs 3 oz 157 lbs 13 oz 154 lbs 10 oz   BMI (Calculated) 0 kg/m2 0 kg/m2 0 kg/m2 0 kg/m2 0 kg/m2 0 kg/m2 0 kg/m2        Vitals:    23 0947   BP: 139/89        Problem List:  Dating LMP = 8 wk US  Hypothyroid - sees PCP regular checks q month  GBS Pos urcx  HSV2 +  Valtrex 35w daily  NIPT WNL  Girl \"Lux\"  US 20w5d = 21w4d 92%  Macrosomia -- rescan 32w  (pt terrified of vaginal delivery). Resolved @34 wk 77%  Anemia - 10.8  Slow Fe  Sciatica  Rectus diastasis  Seeing PT  US 34w5d = 35w6d growth 77% had  Desires primary CS Terrified of VB. Increase risks reviewed at length   12noon    Current Outpatient Medications   Medication Instructions    NP THYROID 120 MG tablet No dose, route, or frequency recorded. Prenatal MV-Min-Fe Fum-FA-DHA (PRENATAL+DHA) 28-0.975 & 200 MG MISC Oral    valACYclovir (VALTREX) 500 mg, Oral, DAILY        Visit Diagnoses:   Diagnosis Orders   1. 37 weeks gestation of pregnancy            Follow Up:  Return for  Section.     Karen Chang MD, FACOG  23  9:59 AM

## 2023-08-28 ENCOUNTER — ANESTHESIA EVENT (OUTPATIENT)
Facility: HOSPITAL | Age: 33
End: 2023-08-28
Payer: COMMERCIAL

## 2023-08-28 ENCOUNTER — HOSPITAL ENCOUNTER (INPATIENT)
Facility: HOSPITAL | Age: 33
LOS: 3 days | Discharge: HOME | End: 2023-08-31
Attending: OBSTETRICS & GYNECOLOGY | Admitting: OBSTETRICS & GYNECOLOGY
Payer: COMMERCIAL

## 2023-08-28 ENCOUNTER — ANESTHESIA (OUTPATIENT)
Facility: HOSPITAL | Age: 33
End: 2023-08-28
Payer: COMMERCIAL

## 2023-08-28 DIAGNOSIS — G89.18 POST-OP PAIN: Primary | ICD-10-CM

## 2023-08-28 PROBLEM — Z3A.38 38 WEEKS GESTATION OF PREGNANCY: Status: ACTIVE | Noted: 2023-08-28

## 2023-08-28 PROBLEM — O34.219 PREVIOUS CESAREAN DELIVERY AFFECTING PREGNANCY: Status: ACTIVE | Noted: 2023-08-28

## 2023-08-28 LAB
ABO + RH BLD: NORMAL
AMNISURE, POC: POSITIVE
BLOOD GROUP ANTIBODIES SERPL: NORMAL
ERYTHROCYTE [DISTWIDTH] IN BLOOD BY AUTOMATED COUNT: 13.8 % (ref 11.5–14.5)
HCT VFR BLD AUTO: 34.5 % (ref 35–47)
HGB BLD-MCNC: 11.3 G/DL (ref 11.5–16)
Lab: NORMAL
MCH RBC QN AUTO: 29.6 PG (ref 26–34)
MCHC RBC AUTO-ENTMCNC: 32.8 G/DL (ref 30–36.5)
MCV RBC AUTO: 90.3 FL (ref 80–99)
NEGATIVE QC PASS/FAIL: NORMAL
NRBC # BLD: 0 K/UL (ref 0–0.01)
NRBC BLD-RTO: 0 PER 100 WBC
PLATELET # BLD AUTO: 292 K/UL (ref 150–400)
PMV BLD AUTO: 10.6 FL (ref 8.9–12.9)
POSITIVE QC PASS/FAIL: NORMAL
RBC # BLD AUTO: 3.82 M/UL (ref 3.8–5.2)
SPECIMEN EXP DATE BLD: NORMAL
WBC # BLD AUTO: 11.4 K/UL (ref 3.6–11)

## 2023-08-28 PROCEDURE — 6360000002 HC RX W HCPCS: Performed by: NURSE ANESTHETIST, CERTIFIED REGISTERED

## 2023-08-28 PROCEDURE — 3700000000 HC ANESTHESIA ATTENDED CARE: Performed by: OBSTETRICS & GYNECOLOGY

## 2023-08-28 PROCEDURE — 3609079900 HC CESAREAN SECTION: Performed by: OBSTETRICS & GYNECOLOGY

## 2023-08-28 PROCEDURE — 86901 BLOOD TYPING SEROLOGIC RH(D): CPT

## 2023-08-28 PROCEDURE — 85027 COMPLETE CBC AUTOMATED: CPT

## 2023-08-28 PROCEDURE — 2580000003 HC RX 258: Performed by: OBSTETRICS & GYNECOLOGY

## 2023-08-28 PROCEDURE — 2580000003 HC RX 258: Performed by: NURSE ANESTHETIST, CERTIFIED REGISTERED

## 2023-08-28 PROCEDURE — 2709999900 HC NON-CHARGEABLE SUPPLY: Performed by: OBSTETRICS & GYNECOLOGY

## 2023-08-28 PROCEDURE — 1100000000 HC RM PRIVATE

## 2023-08-28 PROCEDURE — 6360000002 HC RX W HCPCS: Performed by: OBSTETRICS & GYNECOLOGY

## 2023-08-28 PROCEDURE — 86900 BLOOD TYPING SEROLOGIC ABO: CPT

## 2023-08-28 PROCEDURE — 7100000001 HC PACU RECOVERY - ADDTL 15 MIN: Performed by: OBSTETRICS & GYNECOLOGY

## 2023-08-28 PROCEDURE — 7100000000 HC PACU RECOVERY - FIRST 15 MIN: Performed by: OBSTETRICS & GYNECOLOGY

## 2023-08-28 PROCEDURE — 36415 COLL VENOUS BLD VENIPUNCTURE: CPT

## 2023-08-28 PROCEDURE — 2720000010 HC SURG SUPPLY STERILE: Performed by: OBSTETRICS & GYNECOLOGY

## 2023-08-28 PROCEDURE — 86850 RBC ANTIBODY SCREEN: CPT

## 2023-08-28 PROCEDURE — 3700000001 HC ADD 15 MINUTES (ANESTHESIA): Performed by: OBSTETRICS & GYNECOLOGY

## 2023-08-28 PROCEDURE — 94761 N-INVAS EAR/PLS OXIMETRY MLT: CPT

## 2023-08-28 PROCEDURE — 2500000003 HC RX 250 WO HCPCS: Performed by: NURSE ANESTHETIST, CERTIFIED REGISTERED

## 2023-08-28 RX ORDER — SWAB
1 SWAB, NON-MEDICATED MISCELLANEOUS DAILY
Status: DISCONTINUED | OUTPATIENT
Start: 2023-08-28 | End: 2023-08-31 | Stop reason: HOSPADM

## 2023-08-28 RX ORDER — SODIUM CHLORIDE 0.9 % (FLUSH) 0.9 %
10 SYRINGE (ML) INJECTION PRN
Status: DISCONTINUED | OUTPATIENT
Start: 2023-08-28 | End: 2023-08-31 | Stop reason: HOSPADM

## 2023-08-28 RX ORDER — SODIUM CHLORIDE 0.9 % (FLUSH) 0.9 %
5-40 SYRINGE (ML) INJECTION EVERY 12 HOURS SCHEDULED
Status: DISCONTINUED | OUTPATIENT
Start: 2023-08-28 | End: 2023-08-31 | Stop reason: HOSPADM

## 2023-08-28 RX ORDER — OXYTOCIN 10 [USP'U]/ML
INJECTION, SOLUTION INTRAMUSCULAR; INTRAVENOUS PRN
Status: DISCONTINUED | OUTPATIENT
Start: 2023-08-28 | End: 2023-08-28 | Stop reason: SDUPTHER

## 2023-08-28 RX ORDER — DEXAMETHASONE SODIUM PHOSPHATE 4 MG/ML
INJECTION, SOLUTION INTRA-ARTICULAR; INTRALESIONAL; INTRAMUSCULAR; INTRAVENOUS; SOFT TISSUE PRN
Status: DISCONTINUED | OUTPATIENT
Start: 2023-08-28 | End: 2023-08-28 | Stop reason: SDUPTHER

## 2023-08-28 RX ORDER — SODIUM CHLORIDE 9 MG/ML
INJECTION, SOLUTION INTRAVENOUS PRN
Status: DISCONTINUED | OUTPATIENT
Start: 2023-08-28 | End: 2023-08-28 | Stop reason: SDUPTHER

## 2023-08-28 RX ORDER — OXYCODONE HYDROCHLORIDE 5 MG/1
5 TABLET ORAL EVERY 4 HOURS PRN
Status: ACTIVE | OUTPATIENT
Start: 2023-08-28 | End: 2023-08-29

## 2023-08-28 RX ORDER — MORPHINE SULFATE 1 MG/ML
INJECTION, SOLUTION EPIDURAL; INTRATHECAL; INTRAVENOUS PRN
Status: DISCONTINUED | OUTPATIENT
Start: 2023-08-28 | End: 2023-08-28 | Stop reason: SDUPTHER

## 2023-08-28 RX ORDER — KETOROLAC TROMETHAMINE 30 MG/ML
30 INJECTION, SOLUTION INTRAMUSCULAR; INTRAVENOUS EVERY 6 HOURS
Status: CANCELLED | OUTPATIENT
Start: 2023-08-28 | End: 2023-08-29

## 2023-08-28 RX ORDER — SODIUM CHLORIDE, SODIUM LACTATE, POTASSIUM CHLORIDE, AND CALCIUM CHLORIDE .6; .31; .03; .02 G/100ML; G/100ML; G/100ML; G/100ML
1000 INJECTION, SOLUTION INTRAVENOUS ONCE
Status: COMPLETED | OUTPATIENT
Start: 2023-08-28 | End: 2023-08-28

## 2023-08-28 RX ORDER — OXYCODONE HYDROCHLORIDE 5 MG/1
5 TABLET ORAL EVERY 4 HOURS PRN
Status: DISCONTINUED | OUTPATIENT
Start: 2023-08-28 | End: 2023-08-31 | Stop reason: HOSPADM

## 2023-08-28 RX ORDER — KETOROLAC TROMETHAMINE 30 MG/ML
30 INJECTION, SOLUTION INTRAMUSCULAR; INTRAVENOUS ONCE
Status: DISCONTINUED | OUTPATIENT
Start: 2023-08-28 | End: 2023-08-28 | Stop reason: HOSPADM

## 2023-08-28 RX ORDER — SODIUM CHLORIDE, SODIUM LACTATE, POTASSIUM CHLORIDE, CALCIUM CHLORIDE 600; 310; 30; 20 MG/100ML; MG/100ML; MG/100ML; MG/100ML
INJECTION, SOLUTION INTRAVENOUS CONTINUOUS
Status: DISCONTINUED | OUTPATIENT
Start: 2023-08-28 | End: 2023-08-28 | Stop reason: SDUPTHER

## 2023-08-28 RX ORDER — SODIUM CHLORIDE 0.9 % (FLUSH) 0.9 %
5-40 SYRINGE (ML) INJECTION PRN
Status: DISCONTINUED | OUTPATIENT
Start: 2023-08-28 | End: 2023-08-31 | Stop reason: HOSPADM

## 2023-08-28 RX ORDER — SENNA AND DOCUSATE SODIUM 50; 8.6 MG/1; MG/1
1 TABLET, FILM COATED ORAL DAILY
Status: DISCONTINUED | OUTPATIENT
Start: 2023-08-28 | End: 2023-08-31 | Stop reason: HOSPADM

## 2023-08-28 RX ORDER — KETOROLAC TROMETHAMINE 30 MG/ML
30 INJECTION, SOLUTION INTRAMUSCULAR; INTRAVENOUS EVERY 6 HOURS
Status: DISPENSED | OUTPATIENT
Start: 2023-08-28 | End: 2023-08-29

## 2023-08-28 RX ORDER — OXYCODONE HYDROCHLORIDE 5 MG/1
10 TABLET ORAL EVERY 4 HOURS PRN
Status: CANCELLED | OUTPATIENT
Start: 2023-08-28 | End: 2023-08-29

## 2023-08-28 RX ORDER — PROCHLORPERAZINE EDISYLATE 5 MG/ML
5 INJECTION INTRAMUSCULAR; INTRAVENOUS EVERY 6 HOURS PRN
Status: DISCONTINUED | OUTPATIENT
Start: 2023-08-28 | End: 2023-08-31 | Stop reason: HOSPADM

## 2023-08-28 RX ORDER — MISOPROSTOL 200 UG/1
800 TABLET ORAL PRN
Status: DISCONTINUED | OUTPATIENT
Start: 2023-08-28 | End: 2023-08-31 | Stop reason: HOSPADM

## 2023-08-28 RX ORDER — FAMOTIDINE 10 MG/ML
INJECTION, SOLUTION INTRAVENOUS PRN
Status: DISCONTINUED | OUTPATIENT
Start: 2023-08-28 | End: 2023-08-28 | Stop reason: SDUPTHER

## 2023-08-28 RX ORDER — SIMETHICONE 80 MG
80 TABLET,CHEWABLE ORAL EVERY 6 HOURS PRN
Status: DISCONTINUED | OUTPATIENT
Start: 2023-08-28 | End: 2023-08-31 | Stop reason: HOSPADM

## 2023-08-28 RX ORDER — BUPIVACAINE HYDROCHLORIDE 7.5 MG/ML
INJECTION, SOLUTION INTRASPINAL PRN
Status: DISCONTINUED | OUTPATIENT
Start: 2023-08-28 | End: 2023-08-28 | Stop reason: SDUPTHER

## 2023-08-28 RX ORDER — NALOXONE HYDROCHLORIDE 0.4 MG/ML
INJECTION, SOLUTION INTRAMUSCULAR; INTRAVENOUS; SUBCUTANEOUS PRN
Status: ACTIVE | OUTPATIENT
Start: 2023-08-28 | End: 2023-08-29

## 2023-08-28 RX ORDER — NALOXONE HYDROCHLORIDE 0.4 MG/ML
INJECTION, SOLUTION INTRAMUSCULAR; INTRAVENOUS; SUBCUTANEOUS PRN
Status: CANCELLED | OUTPATIENT
Start: 2023-08-28 | End: 2023-08-29

## 2023-08-28 RX ORDER — OXYCODONE HYDROCHLORIDE 5 MG/1
10 TABLET ORAL EVERY 4 HOURS PRN
Status: DISCONTINUED | OUTPATIENT
Start: 2023-08-28 | End: 2023-08-31 | Stop reason: HOSPADM

## 2023-08-28 RX ORDER — ACETAMINOPHEN 500 MG
1000 TABLET ORAL EVERY 8 HOURS SCHEDULED
Status: DISCONTINUED | OUTPATIENT
Start: 2023-08-28 | End: 2023-08-31 | Stop reason: HOSPADM

## 2023-08-28 RX ORDER — OXYCODONE HYDROCHLORIDE 5 MG/1
5 TABLET ORAL EVERY 4 HOURS PRN
Status: CANCELLED | OUTPATIENT
Start: 2023-08-28 | End: 2023-08-29

## 2023-08-28 RX ORDER — ONDANSETRON 2 MG/ML
INJECTION INTRAMUSCULAR; INTRAVENOUS PRN
Status: DISCONTINUED | OUTPATIENT
Start: 2023-08-28 | End: 2023-08-28 | Stop reason: SDUPTHER

## 2023-08-28 RX ORDER — OXYCODONE HYDROCHLORIDE 5 MG/1
10 TABLET ORAL EVERY 4 HOURS PRN
Status: ACTIVE | OUTPATIENT
Start: 2023-08-28 | End: 2023-08-29

## 2023-08-28 RX ORDER — ONDANSETRON 2 MG/ML
4 INJECTION INTRAMUSCULAR; INTRAVENOUS EVERY 6 HOURS PRN
Status: DISCONTINUED | OUTPATIENT
Start: 2023-08-28 | End: 2023-08-31 | Stop reason: HOSPADM

## 2023-08-28 RX ORDER — SODIUM CHLORIDE 9 MG/ML
INJECTION, SOLUTION INTRAVENOUS PRN
Status: DISCONTINUED | OUTPATIENT
Start: 2023-08-28 | End: 2023-08-31 | Stop reason: HOSPADM

## 2023-08-28 RX ORDER — SODIUM CHLORIDE, SODIUM LACTATE, POTASSIUM CHLORIDE, CALCIUM CHLORIDE 600; 310; 30; 20 MG/100ML; MG/100ML; MG/100ML; MG/100ML
INJECTION, SOLUTION INTRAVENOUS CONTINUOUS
Status: DISCONTINUED | OUTPATIENT
Start: 2023-08-28 | End: 2023-08-31 | Stop reason: HOSPADM

## 2023-08-28 RX ORDER — IBUPROFEN 600 MG/1
600 TABLET ORAL EVERY 8 HOURS
Status: DISCONTINUED | OUTPATIENT
Start: 2023-08-28 | End: 2023-08-31 | Stop reason: HOSPADM

## 2023-08-28 RX ORDER — TRANEXAMIC ACID 10 MG/ML
1000 INJECTION, SOLUTION INTRAVENOUS
Status: ACTIVE | OUTPATIENT
Start: 2023-08-28 | End: 2023-08-29

## 2023-08-28 RX ORDER — LANOLIN/MINERAL OIL
LOTION (ML) TOPICAL
Status: DISCONTINUED | OUTPATIENT
Start: 2023-08-28 | End: 2023-08-31 | Stop reason: HOSPADM

## 2023-08-28 RX ORDER — DIPHENHYDRAMINE HYDROCHLORIDE 50 MG/ML
25 INJECTION INTRAMUSCULAR; INTRAVENOUS EVERY 6 HOURS PRN
Status: DISCONTINUED | OUTPATIENT
Start: 2023-08-28 | End: 2023-08-31 | Stop reason: HOSPADM

## 2023-08-28 RX ORDER — FAMOTIDINE 20 MG/1
20 TABLET, FILM COATED ORAL 2 TIMES DAILY PRN
Status: DISCONTINUED | OUTPATIENT
Start: 2023-08-28 | End: 2023-08-31 | Stop reason: HOSPADM

## 2023-08-28 RX ORDER — ONDANSETRON 4 MG/1
8 TABLET, ORALLY DISINTEGRATING ORAL EVERY 8 HOURS PRN
Status: DISCONTINUED | OUTPATIENT
Start: 2023-08-28 | End: 2023-08-31 | Stop reason: HOSPADM

## 2023-08-28 RX ORDER — METHYLERGONOVINE MALEATE 0.2 MG/ML
200 INJECTION INTRAVENOUS PRN
Status: DISCONTINUED | OUTPATIENT
Start: 2023-08-28 | End: 2023-08-31 | Stop reason: HOSPADM

## 2023-08-28 RX ADMIN — OXYTOCIN 10 UNITS: 10 INJECTION, SOLUTION INTRAMUSCULAR; INTRAVENOUS at 12:22

## 2023-08-28 RX ADMIN — FAMOTIDINE 10 MG: 10 INJECTION INTRAVENOUS at 12:01

## 2023-08-28 RX ADMIN — ONDANSETRON HYDROCHLORIDE 8 MG: 2 SOLUTION INTRAMUSCULAR; INTRAVENOUS at 12:00

## 2023-08-28 RX ADMIN — OXYTOCIN 30 UNITS: 10 INJECTION, SOLUTION INTRAMUSCULAR; INTRAVENOUS at 12:41

## 2023-08-28 RX ADMIN — CEFOXITIN SODIUM 2000 MG: 2 POWDER, FOR SOLUTION INTRAVENOUS at 12:10

## 2023-08-28 RX ADMIN — KETOROLAC TROMETHAMINE 30 MG: 30 INJECTION, SOLUTION INTRAMUSCULAR; INTRAVENOUS at 19:45

## 2023-08-28 RX ADMIN — BUPIVACAINE HYDROCHLORIDE IN DEXTROSE 1.3 ML: 7.5 INJECTION, SOLUTION SUBARACHNOID at 12:06

## 2023-08-28 RX ADMIN — KETOROLAC TROMETHAMINE 30 MG: 30 INJECTION, SOLUTION INTRAMUSCULAR; INTRAVENOUS at 13:21

## 2023-08-28 RX ADMIN — MORPHINE SULFATE 0.2 MG: 1 INJECTION, SOLUTION EPIDURAL; INTRATHECAL; INTRAVENOUS at 12:06

## 2023-08-28 RX ADMIN — SODIUM CHLORIDE, POTASSIUM CHLORIDE, SODIUM LACTATE AND CALCIUM CHLORIDE: 600; 310; 30; 20 INJECTION, SOLUTION INTRAVENOUS at 12:41

## 2023-08-28 RX ADMIN — SODIUM CHLORIDE, POTASSIUM CHLORIDE, SODIUM LACTATE AND CALCIUM CHLORIDE: 600; 310; 30; 20 INJECTION, SOLUTION INTRAVENOUS at 20:46

## 2023-08-28 RX ADMIN — SODIUM CHLORIDE, POTASSIUM CHLORIDE, SODIUM LACTATE AND CALCIUM CHLORIDE 1000 ML: 600; 310; 30; 20 INJECTION, SOLUTION INTRAVENOUS at 05:50

## 2023-08-28 RX ADMIN — DEXAMETHASONE SODIUM PHOSPHATE 4 MG: 4 INJECTION, SOLUTION INTRAMUSCULAR; INTRAVENOUS at 12:24

## 2023-08-28 RX ADMIN — PHENYLEPHRINE HYDROCHLORIDE 30 MCG/MIN: 10 INJECTION INTRAVENOUS at 12:07

## 2023-08-28 RX ADMIN — SODIUM CHLORIDE, POTASSIUM CHLORIDE, SODIUM LACTATE AND CALCIUM CHLORIDE: 600; 310; 30; 20 INJECTION, SOLUTION INTRAVENOUS at 13:00

## 2023-08-28 RX ADMIN — SODIUM CHLORIDE, POTASSIUM CHLORIDE, SODIUM LACTATE AND CALCIUM CHLORIDE: 600; 310; 30; 20 INJECTION, SOLUTION INTRAVENOUS at 11:59

## 2023-08-28 RX ADMIN — PROCHLORPERAZINE EDISYLATE 5 MG: 5 INJECTION INTRAMUSCULAR; INTRAVENOUS at 13:55

## 2023-08-28 RX ADMIN — ONDANSETRON 4 MG: 2 INJECTION INTRAMUSCULAR; INTRAVENOUS at 19:45

## 2023-08-28 ASSESSMENT — PAIN SCALES - GENERAL: PAINLEVEL_OUTOF10: 0

## 2023-08-28 NOTE — CARE COORDINATION
8/28/2023  3:33 PM    CM met with TEJA to complete initial assessment and begin discharge planning. MOB verified and confirmed demographics. TEJA lives with family, at the address on file. TEJA reports she has good family support, and feels like she has the support she needs when she returns home. TEJA plans to bottle feed baby. [facility] will provide follow up care for infant. TEJA has car seat, bassinet/crib, clothing, bottles and all necessary supplies for baby. 08/28/23 4212   Service Assessment   Patient Orientation Alert and Oriented   Cognition Alert   History Provided By Patient   Primary Caregiver Self   Support Systems Spouse/Significant Other   PCP Verified by CM Yes   Last Visit to PCP Within last 3 months   Prior Functional Level Independent in ADLs/IADLs   Current Functional Level Independent in ADLs/IADLs   Can patient return to prior living arrangement Yes   Ability to make needs known: Good   Family able to assist with home care needs: Yes   Would you like for me to discuss the discharge plan with any other family members/significant others, and if so, who?  No     Ezequiel SpringerBS

## 2023-08-28 NOTE — OP NOTE
Section Procedure Note      Name: Perfecto Weiner   Medical Record Number: [id number]   YOB: 1990  Date of Surgery: 2023    Preoperative Diagnosis: Elective surgery [Z41.9]    Postoperative Diagnosis: Same    Procedure: Procedure(s):   SECTION    OR Staff:  Surgeon(s):  Cherie Pelayo II, MD  Surgical Assistant: Luci Anguiano  Anesthesiologist: Amandeep Aguero MD  CRNA: MEGAN Madsen Cha, CRNA     Anesthesia: Spinal    Findings: see below    Estimated Blood Loss:   500    Drains: * No LDAs found *    Pathology /Specimens:  * No specimens in log *    Implants:  * No implants in log *    DVT Prophylaxis: SCD Hose    Antibiotic Prophylaxis: Ancef    Complications: None      Fetal Description: caballero female    Birth Information:   Information for the patient's :  Rebekah Flood Pending Richard Shirleyjaycee [[id number]]        Umbilical Cord: normal    Placenta:  manual    Specimens: * No specimens in log *     Implants:  None           Complications:  none    EBL: 500    Procedure Details: The patient was taken to the operative suite and place in the dorsal supine position with left lateral tilt. She was prepped and draped in the usual fashion and a Mckeon catheter had been placed using sterile technique. A Pfannenstiel incision was made and extended down through the subcutaneous tissues. The fascia was incised sharply and extended out bilaterally both sharply and bluntly. The rectus muscles were  in the midline. The peritoneum was entered without difficulty. An Ezequiel O-ring retractor was placed. The peritoneum over the lower uterine segment was incised and the bladder flap created and dissected downward. The lower uterine segment was scored transversely with a knife and entered sharply in the midline. The incision was extended bluntly using the operators fingers.   The Chorio-amniotic membranes were ruptured and medium amount clear fluid was encountered. The infant was then delivered onto the operative field and the mouth and nose bulb suctioned. After a 39 second delay, the cord was clamped and cut and the infant handed off to the pediatric team in attendance. The placenta was delivered manually; it was normal and intact. It was not sent to pathology. The uterine cavity was curettage with a moist lap pad x2. Pitocin was given IV by anesthesia. The uterine incision was closed with a running interlocked stitch of 0-Vicryl. It was then imbricated with a second layer of 0-Vicryl. Hemostasis was excellent. The abdomen was irrigated with copious amounts of sterile saline and remaining blood and fluid was suctioned from the abdomen. The anterior peritoneum and rectus muscles were reapproximated in the midline with a horizontal mattress suture of 2-0 Vicryl. The fascia was then closed with a running stitch of 0-Vicryl. Remaining bleeders in the subcutaneous space were Bovie cauterized until hemostasis was achieved. The subcutaneous space was irrigated and patted dry. It was noted to be hemostatic. The skin was closed with a 3-0 monocryl subcuticular suture. Dermabond was applied. All counts were correct x2. The patient and infant were taken to the recovery room in good condition.     Signed By:  Tyler Jin MD     August 28, 2023 12:10 PM

## 2023-08-28 NOTE — ANESTHESIA PROCEDURE NOTES
Spinal Block    Patient location during procedure: OB  End time: 8/28/2023 12:06 PM  Reason for block: post-op pain management, primary anesthetic and at surgeon's request  Staffing  Performed: resident/CRNA   Anesthesiologist: Rodo Peterson MD  Resident/CRNA: MEGAN Gaitan CRNA  Spinal Block  Patient position: sitting  Prep: DuraPrep  Patient monitoring: cardiac monitor, continuous pulse ox, continuous capnometry, frequent blood pressure checks and oxygen  Approach: midline  Location: L3/L4  Provider prep: mask and sterile gloves  Local infiltration: lidocaine  Needle  Needle type: Pencan   Needle gauge: 25 G  Needle length: 3.5 in  Assessment  Swirl obtained: Yes  CSF: clear  Attempts: 1  Hemodynamics: stable  Preanesthetic Checklist  Completed: patient identified, IV checked, site marked, risks and benefits discussed, surgical/procedural consents, pre-op evaluation, timeout performed, anesthesia consent given, oxygen available and monitors applied/VS acknowledged

## 2023-08-28 NOTE — PROGRESS NOTES
0700: Bedside and Verbal shift change report given to [address] (oncoming nurse) by Paula Jasso RN (offgoing nurse). Report included the following information Nurse Handoff Report. 0740: Spoke with Dr. Lashay Graham via telphone.  Pt will have c/s scheduled for 1200

## 2023-08-28 NOTE — L&D DELIVERY NOTE
rGiffin Briceno, Female Sri [[id number]]      Labor Events     Labor: No   Steroids: None  Cervical Ripening Date/Time:      Antibiotics Received during Labor: No  Rupture Identifier: Sac 1  Rupture Date/Time:  23 04:30:00   Rupture Type: SROM  Fluid Color: Clear  Fluid Odor: None  Induction: None  Augmentation: None  Labor Complications: None              Anesthesia    Method: Spinal       Labor Length    3rd stage: 0h 01m       Delivery Details      Delivery Date: 23 Delivery Time: 12:21:00   Delivery Type: , Low Transverse  Trial of Labor?: No   Categorization: Primary   Priority: Scheduled  Indications for : No Labor - Maternal Request       Skin Incision Type: Low Transverse  Uterine Incision: Low Transverse       [place] Presentation    Presentation: Vertex  Position: Left  _: Occiput  _: Anterior       Shoulder Dystocia    Shoulder Dystocia Present?: No       Assisted Delivery Details    Forceps Attempted?: No  Vacuum Extractor Attempted?: No                           Cord    Vessels: 3 Vessels  Complications: None  Delayed Cord Clamping?: Yes  Cord Clamped Date/Time: 2023 12:22:00  Cord Blood Disposition: Discard              Placenta    Date/Time: 2023 12:22:58  Removal: Expressed  Appearance: Intact  Disposition: Discarded       Lacerations    Episiotomy: None  Perineal Lacerations: None  Other Lacerations: no non-perineal laceration       Blood Loss  Mother: Nuris Rock #[id number]     Start of Mother's Information      Delivery Blood Loss  23 1157 - 23 0850      Quantitative Blood Loss (mL) Hospital Encounter 589 grams    Total  589 mL               End of Mother's Information  Mother: Nuris Rock #[id number]                Delivery Providers    Delivering clinician: Fer Morrison MD     Provider Role    Balbina Andrew II, MD Obstetrician    Namrata Georges, RN Primary Nurse    Estuardo Colvin, RN Primary [place] Nurse MEGAN San - CRNA Nurse Anesthetist    Radha Goff Surgical Tech    [facility]               Assessment    Living Status: Living        Skin Color:   Heart Rate:   Reflex Irritability:   Muscle Tone:   Respiratory Effort:    Total:            1 Minute:    1    2    2    2    2    9         5 Minute:    1    2    2    2    2    9                                        Apgars Assigned By: REYNA JIANG              Resuscitation    Method: Stimulation, Bulb Suction             [place] Measurements      Birth Weight: 3775 g   Birth Length: 51.4 cm     Head Circumference: 34 cm     Chest Circumference: 35.5 cm     Abdominal Girth: 32.5 cm

## 2023-08-28 NOTE — PROGRESS NOTES
0545: Pt arrives to unit complaining of SROM at 0430. Pt has a scheduled  on  for LGA and rectus diastasis. Pt denies vaginal bleeding and confirms fetal movement. Pt oriented to room and call bell.     0600: Pt moved to room 205.     0624: Dr. Ghazal Eid at bedside evaluating pt. SVE performed /2. MD to call Dr. Jody Rosales. 0700: Bedside and Verbal shift change report given to LESLIE Grant (oncoming nurse) by LESLIE Ewing RN (offgoing nurse). Report included the following information Nurse Handoff Report, Index, Intake/Output, MAR, Recent Results, and Med Rec Status.

## 2023-08-28 NOTE — H&P
History & Physical    Name: Krista Castro MRN: [id number]  SSN: [id number]    YOB: 1990  Age: 28 y.o. Sex: female        Subjective:     Estimated Date of Delivery: 23  OB History          2    Para   0    Term   0       0    AB   1    Living   0         SAB   1    IAB   0    Ectopic   0    Molar   0    Multiple   0    Live Births   0                MsGrant Hahn is a 29 y/o F  admitted with pregnancy at 38w4d for active labor with SROM. She denied vaginal bleeding and frequent contractions. Prenatal course was complicated with pelvic floor disorder and diastasis recti. Pt also has anxiety over a trial of labor. Please see prenatal records for details. Past Medical History:   Diagnosis Date    Anemia     GBS carrier     Herpes simplex virus (HSV) infection     Hypothyroid     Pilonidal cyst     Rectus diastasis 2023    12 cm separation saw PT     History reviewed. No pertinent surgical history. Social History     Occupational History    Occupation:    Tobacco Use    Smoking status: Never    Smokeless tobacco: Never   Vaping Use    Vaping Use: Never used   Substance and Sexual Activity    Alcohol use: Not Currently    Drug use: Never    Sexual activity: Yes     Partners: Male     Birth control/protection: None     Family History   Problem Relation Age of Onset    No Known Problems Mother     Breast Cancer Father     Thyroid Disease Sister     Heart Disease Maternal Grandfather     Colon Cancer Paternal Grandfather        Allergies   Allergen Reactions    Influenza Vaccines Anaphylaxis    Influenza Virus Vaccine Anaphylaxis    Azithromycin Nausea And Vomiting     Other reaction(s): GI Intolerance     Prior to Admission medications    Medication Sig Start Date End Date Taking?  Authorizing Provider   NP THYROID 120 MG tablet  23   Historical Provider, MD   valACYclovir (VALTREX) 500 MG tablet Take 1 tablet by mouth daily 23  Albert Wilson MD

## 2023-08-29 LAB
ERYTHROCYTE [DISTWIDTH] IN BLOOD BY AUTOMATED COUNT: 13.4 % (ref 11.5–14.5)
HCT VFR BLD AUTO: 32.1 % (ref 35–47)
HGB BLD-MCNC: 10.5 G/DL (ref 11.5–16)
MCH RBC QN AUTO: 30 PG (ref 26–34)
MCHC RBC AUTO-ENTMCNC: 32.7 G/DL (ref 30–36.5)
MCV RBC AUTO: 91.7 FL (ref 80–99)
NRBC # BLD: 0 K/UL (ref 0–0.01)
NRBC BLD-RTO: 0 PER 100 WBC
PLATELET # BLD AUTO: 253 K/UL (ref 150–400)
PMV BLD AUTO: 10.1 FL (ref 8.9–12.9)
RBC # BLD AUTO: 3.5 M/UL (ref 3.8–5.2)
WBC # BLD AUTO: 21.8 K/UL (ref 3.6–11)

## 2023-08-29 PROCEDURE — 1100000000 HC RM PRIVATE

## 2023-08-29 PROCEDURE — 6360000002 HC RX W HCPCS: Performed by: OBSTETRICS & GYNECOLOGY

## 2023-08-29 PROCEDURE — 85027 COMPLETE CBC AUTOMATED: CPT

## 2023-08-29 PROCEDURE — 36415 COLL VENOUS BLD VENIPUNCTURE: CPT

## 2023-08-29 PROCEDURE — 6370000000 HC RX 637 (ALT 250 FOR IP): Performed by: OBSTETRICS & GYNECOLOGY

## 2023-08-29 RX ADMIN — SENNOSIDES AND DOCUSATE SODIUM 1 TABLET: 50; 8.6 TABLET ORAL at 20:17

## 2023-08-29 RX ADMIN — IBUPROFEN 600 MG: 600 TABLET, FILM COATED ORAL at 09:30

## 2023-08-29 RX ADMIN — SIMETHICONE 80 MG: 80 TABLET, CHEWABLE ORAL at 20:18

## 2023-08-29 RX ADMIN — SENNOSIDES AND DOCUSATE SODIUM 1 TABLET: 50; 8.6 TABLET ORAL at 09:31

## 2023-08-29 RX ADMIN — ACETAMINOPHEN 1000 MG: 500 TABLET ORAL at 20:18

## 2023-08-29 RX ADMIN — ACETAMINOPHEN 1000 MG: 500 TABLET ORAL at 11:58

## 2023-08-29 RX ADMIN — KETOROLAC TROMETHAMINE 30 MG: 30 INJECTION, SOLUTION INTRAMUSCULAR; INTRAVENOUS at 01:43

## 2023-08-29 RX ADMIN — Medication 1 TABLET: at 09:30

## 2023-08-29 RX ADMIN — IBUPROFEN 600 MG: 600 TABLET, FILM COATED ORAL at 18:19

## 2023-08-29 ASSESSMENT — PAIN DESCRIPTION - DESCRIPTORS
DESCRIPTORS: ACHING;SORE
DESCRIPTORS: ACHING;SORE
DESCRIPTORS: SORE
DESCRIPTORS: ACHING;SORE

## 2023-08-29 ASSESSMENT — PAIN DESCRIPTION - ORIENTATION
ORIENTATION: LOWER

## 2023-08-29 ASSESSMENT — PAIN - FUNCTIONAL ASSESSMENT
PAIN_FUNCTIONAL_ASSESSMENT: ACTIVITIES ARE NOT PREVENTED

## 2023-08-29 ASSESSMENT — PAIN SCALES - GENERAL
PAINLEVEL_OUTOF10: 4
PAINLEVEL_OUTOF10: 6
PAINLEVEL_OUTOF10: 0
PAINLEVEL_OUTOF10: 6
PAINLEVEL_OUTOF10: 1

## 2023-08-29 ASSESSMENT — PAIN DESCRIPTION - LOCATION
LOCATION: ABDOMEN;INCISION

## 2023-08-30 PROCEDURE — 6370000000 HC RX 637 (ALT 250 FOR IP): Performed by: OBSTETRICS & GYNECOLOGY

## 2023-08-30 PROCEDURE — 1100000000 HC RM PRIVATE

## 2023-08-30 RX ADMIN — ACETAMINOPHEN 1000 MG: 500 TABLET ORAL at 06:03

## 2023-08-30 RX ADMIN — IBUPROFEN 600 MG: 600 TABLET, FILM COATED ORAL at 18:22

## 2023-08-30 RX ADMIN — ACETAMINOPHEN 1000 MG: 500 TABLET ORAL at 15:38

## 2023-08-30 RX ADMIN — IBUPROFEN 600 MG: 600 TABLET, FILM COATED ORAL at 02:39

## 2023-08-30 RX ADMIN — IBUPROFEN 600 MG: 600 TABLET, FILM COATED ORAL at 10:21

## 2023-08-30 ASSESSMENT — PAIN DESCRIPTION - ORIENTATION
ORIENTATION: LOWER

## 2023-08-30 ASSESSMENT — PAIN SCALES - GENERAL
PAINLEVEL_OUTOF10: 4
PAINLEVEL_OUTOF10: 5
PAINLEVEL_OUTOF10: 4
PAINLEVEL_OUTOF10: 5
PAINLEVEL_OUTOF10: 3

## 2023-08-30 ASSESSMENT — PAIN DESCRIPTION - LOCATION
LOCATION: ABDOMEN
LOCATION: INCISION
LOCATION: ABDOMEN

## 2023-08-30 ASSESSMENT — PAIN DESCRIPTION - DESCRIPTORS
DESCRIPTORS: SORE

## 2023-08-30 ASSESSMENT — PAIN - FUNCTIONAL ASSESSMENT
PAIN_FUNCTIONAL_ASSESSMENT: ACTIVITIES ARE NOT PREVENTED
PAIN_FUNCTIONAL_ASSESSMENT: ACTIVITIES ARE NOT PREVENTED

## 2023-08-31 VITALS
HEART RATE: 66 BPM | SYSTOLIC BLOOD PRESSURE: 120 MMHG | OXYGEN SATURATION: 100 % | DIASTOLIC BLOOD PRESSURE: 81 MMHG | RESPIRATION RATE: 16 BRPM | TEMPERATURE: 98.1 F

## 2023-08-31 PROCEDURE — 6370000000 HC RX 637 (ALT 250 FOR IP): Performed by: OBSTETRICS & GYNECOLOGY

## 2023-08-31 RX ORDER — IBUPROFEN 800 MG/1
800 TABLET ORAL EVERY 6 HOURS PRN
Qty: 60 TABLET | Refills: 1 | Status: SHIPPED | OUTPATIENT
Start: 2023-08-31 | End: 2023-09-30

## 2023-08-31 RX ORDER — OXYCODONE HYDROCHLORIDE 5 MG/1
5 TABLET ORAL EVERY 6 HOURS PRN
Qty: 15 TABLET | Refills: 0 | Status: SHIPPED | OUTPATIENT
Start: 2023-08-31 | End: 2023-09-07

## 2023-08-31 RX ADMIN — ACETAMINOPHEN 1000 MG: 500 TABLET ORAL at 08:45

## 2023-08-31 RX ADMIN — IBUPROFEN 600 MG: 600 TABLET, FILM COATED ORAL at 08:44

## 2023-08-31 RX ADMIN — ACETAMINOPHEN 1000 MG: 500 TABLET ORAL at 00:59

## 2023-08-31 RX ADMIN — IBUPROFEN 600 MG: 600 TABLET, FILM COATED ORAL at 00:59

## 2023-08-31 ASSESSMENT — PAIN SCALES - GENERAL
PAINLEVEL_OUTOF10: 6
PAINLEVEL_OUTOF10: 3

## 2023-08-31 ASSESSMENT — PAIN DESCRIPTION - LOCATION: LOCATION: ABDOMEN

## 2023-08-31 NOTE — DISCHARGE SUMMARY
Obstetrical Discharge Summary     Name: Leigh Ospina MRN: [id number]  SSN: [id number]    YOB: 1990  Age: 28 y.o. Sex: female      Admit Date: 2023    Discharge Date: 2023     Admitting Physician: Slick Little MD     Attending Physician:  Pepito Thomas MD     Admission Diagnoses: Elective surgery [Z41.9]  Previous  delivery affecting pregnancy [O34.219]  Discharge Diagnoses:   Elective surgery [Z41.9]  Previous  delivery affecting pregnancy [O34.219]    Additional Diagnoses: Principal Problem:    Full-term premature rupture of membranes with onset of labor within 24 hours of rupture  Active Problems:    Previous  delivery affecting pregnancy    38 weeks gestation of pregnancy  Resolved Problems:    * No resolved hospital problems. *       Immunization(s):   Immunization History   Administered Date(s) Administered    TDaP, ADACEL (age 6y-58y), [facility] (age 10y+), IM, 0.5mL 2023        Delivery Information:  Delivery Type: , Low Transverse      By: Pepito Thomas    On: 2023   at: 12:21 PM  '     Hospital Course: Normal hospital course following the delivery. The patient was released to her home in good condition. Patient Instructions:     Reference my discharge instructions. Current Discharge Medication List        START taking these medications    Details   oxyCODONE (ROXICODONE) 5 MG immediate release tablet Take 1 tablet by mouth every 6 hours as needed for Pain for up to 7 days.  Max Daily Amount: 20 mg  Qty: 15 tablet, Refills: 0  Start date: 2023, End date: 2023    Comments: Reduce doses taken as pain becomes manageable  Associated Diagnoses: Post-op pain      ibuprofen (ADVIL;MOTRIN) 800 MG tablet Take 1 tablet by mouth every 6 hours as needed for Pain  Qty: 60 tablet, Refills: 1  Start date: 2023, End date: 2023           CONTINUE these medications which have NOT CHANGED    Details   NP THYROID 120

## 2023-08-31 NOTE — PROGRESS NOTES
Discharge instructions given to patient at bedside including but not limited to signs and symptoms and who to call; restrictions and limitations; postpartum depression. All questions answered. Discharge supplies given to patient. Signature pad not working in room. Hard signed copy placed in chart. Prescriptions sent to patient's pharmacy.

## 2023-09-01 NOTE — ANESTHESIA POSTPROCEDURE EVALUATION
Department of Anesthesiology  Postprocedure Note    Patient: Anjana David  MRN: [id number]  YOB: 1990  Date of evaluation: 2023      Procedure Summary     Date: 23 Room / Location: [facility] L&D 02 / [facility] L&D OR    Anesthesia Start: 1159 Anesthesia Stop: 1252    Procedure:  SECTION (Abdomen) Diagnosis:       Elective surgery      (Elective surgery [Z41.9])    Surgeons: Damari Pickett MD Responsible Provider: Zully Sauceda DO    Anesthesia Type: Spinal ASA Status: 2          Anesthesia Type: Spinal    Rosy Phase I:      Rosy Phase II:        Anesthesia Post Evaluation    Comments: Discharged by nursing per protocol.

## 2023-09-08 ENCOUNTER — POSTPARTUM VISIT (OUTPATIENT)
Age: 33
End: 2023-09-08

## 2023-09-08 ENCOUNTER — HOSPITAL ENCOUNTER (INPATIENT)
Facility: HOSPITAL | Age: 33
LOS: 1 days | Discharge: HOME | DRG: 776 | End: 2023-09-10
Attending: OBSTETRICS & GYNECOLOGY | Admitting: OBSTETRICS & GYNECOLOGY
Payer: COMMERCIAL

## 2023-09-08 VITALS
SYSTOLIC BLOOD PRESSURE: 188 MMHG | HEIGHT: 62 IN | DIASTOLIC BLOOD PRESSURE: 126 MMHG | WEIGHT: 157.4 LBS | BODY MASS INDEX: 28.97 KG/M2

## 2023-09-08 DIAGNOSIS — R03.0 ELEVATED BLOOD PRESSURE READING: ICD-10-CM

## 2023-09-08 DIAGNOSIS — R51.9 POSTPARTUM HEADACHE: Primary | ICD-10-CM

## 2023-09-08 LAB
ALBUMIN SERPL-MCNC: 3.4 G/DL (ref 3.5–5)
ALBUMIN/GLOB SERPL: 0.9 (ref 1.1–2.2)
ALP SERPL-CCNC: 160 U/L (ref 45–117)
ALT SERPL-CCNC: 31 U/L (ref 12–78)
ANION GAP SERPL CALC-SCNC: 9 MMOL/L (ref 5–15)
AST SERPL-CCNC: 21 U/L (ref 15–37)
BASOPHILS # BLD: 0 K/UL (ref 0–0.1)
BASOPHILS NFR BLD: 0 % (ref 0–1)
BILIRUB SERPL-MCNC: 0.2 MG/DL (ref 0.2–1)
BILIRUBIN, URINE, POC: NEGATIVE
BLOOD URINE, POC: NORMAL
BUN SERPL-MCNC: 16 MG/DL (ref 6–20)
BUN/CREAT SERPL: 23 (ref 12–20)
CALCIUM SERPL-MCNC: 9.6 MG/DL (ref 8.5–10.1)
CHLORIDE SERPL-SCNC: 111 MMOL/L (ref 97–108)
CO2 SERPL-SCNC: 23 MMOL/L (ref 21–32)
CREAT SERPL-MCNC: 0.71 MG/DL (ref 0.55–1.02)
CREAT UR-MCNC: 22 MG/DL
DIFFERENTIAL METHOD BLD: ABNORMAL
EOSINOPHIL # BLD: 0.1 K/UL (ref 0–0.4)
EOSINOPHIL NFR BLD: 1 % (ref 0–7)
ERYTHROCYTE [DISTWIDTH] IN BLOOD BY AUTOMATED COUNT: 13.9 % (ref 11.5–14.5)
GLOBULIN SER CALC-MCNC: 4 G/DL (ref 2–4)
GLUCOSE SERPL-MCNC: 85 MG/DL (ref 65–100)
GLUCOSE URINE, POC: NEGATIVE
HCT VFR BLD AUTO: 34.9 % (ref 35–47)
HGB BLD-MCNC: 11.3 G/DL (ref 11.5–16)
IMM GRANULOCYTES # BLD AUTO: 0.1 K/UL (ref 0–0.04)
IMM GRANULOCYTES NFR BLD AUTO: 1 % (ref 0–0.5)
KETONES, URINE, POC: NEGATIVE
LEUKOCYTE ESTERASE, URINE, POC: NEGATIVE
LYMPHOCYTES # BLD: 2 K/UL (ref 0.8–3.5)
LYMPHOCYTES NFR BLD: 18 % (ref 12–49)
MCH RBC QN AUTO: 29.8 PG (ref 26–34)
MCHC RBC AUTO-ENTMCNC: 32.4 G/DL (ref 30–36.5)
MCV RBC AUTO: 92.1 FL (ref 80–99)
MONOCYTES # BLD: 0.6 K/UL (ref 0–1)
MONOCYTES NFR BLD: 6 % (ref 5–13)
NEUTS SEG # BLD: 8.3 K/UL (ref 1.8–8)
NEUTS SEG NFR BLD: 74 % (ref 32–75)
NITRITE, URINE, POC: NEGATIVE
NRBC # BLD: 0 K/UL (ref 0–0.01)
NRBC BLD-RTO: 0 PER 100 WBC
PH, URINE, POC: 4.6 (ref 4.6–8)
PLATELET # BLD AUTO: 435 K/UL (ref 150–400)
PMV BLD AUTO: 9.9 FL (ref 8.9–12.9)
POTASSIUM SERPL-SCNC: 4.1 MMOL/L (ref 3.5–5.1)
PROT SERPL-MCNC: 7.4 G/DL (ref 6.4–8.2)
PROT UR-MCNC: 6 MG/DL (ref 0–11.9)
PROT/CREAT UR-RTO: 0.3
PROTEIN,URINE, POC: NEGATIVE
RBC # BLD AUTO: 3.79 M/UL (ref 3.8–5.2)
SODIUM SERPL-SCNC: 143 MMOL/L (ref 136–145)
SPECIFIC GRAVITY, URINE, POC: 1 (ref 1–1.03)
URINALYSIS CLARITY, POC: CLEAR
URINALYSIS COLOR, POC: YELLOW
UROBILINOGEN, POC: NORMAL
WBC # BLD AUTO: 11.1 K/UL (ref 3.6–11)

## 2023-09-08 PROCEDURE — 84156 ASSAY OF PROTEIN URINE: CPT

## 2023-09-08 PROCEDURE — 96376 TX/PRO/DX INJ SAME DRUG ADON: CPT

## 2023-09-08 PROCEDURE — 85025 COMPLETE CBC W/AUTO DIFF WBC: CPT

## 2023-09-08 PROCEDURE — 6360000002 HC RX W HCPCS: Performed by: OBSTETRICS & GYNECOLOGY

## 2023-09-08 PROCEDURE — 6370000000 HC RX 637 (ALT 250 FOR IP): Performed by: OBSTETRICS & GYNECOLOGY

## 2023-09-08 PROCEDURE — 36415 COLL VENOUS BLD VENIPUNCTURE: CPT

## 2023-09-08 PROCEDURE — 80053 COMPREHEN METABOLIC PANEL: CPT

## 2023-09-08 PROCEDURE — 96375 TX/PRO/DX INJ NEW DRUG ADDON: CPT

## 2023-09-08 PROCEDURE — 99223 1ST HOSP IP/OBS HIGH 75: CPT | Performed by: OBSTETRICS & GYNECOLOGY

## 2023-09-08 PROCEDURE — G0378 HOSPITAL OBSERVATION PER HR: HCPCS

## 2023-09-08 PROCEDURE — 82570 ASSAY OF URINE CREATININE: CPT

## 2023-09-08 RX ORDER — NIFEDIPINE 30 MG/1
30 TABLET, EXTENDED RELEASE ORAL 2 TIMES DAILY
Status: DISCONTINUED | OUTPATIENT
Start: 2023-09-08 | End: 2023-09-08

## 2023-09-08 RX ORDER — HYDRALAZINE HYDROCHLORIDE 20 MG/ML
10 INJECTION INTRAMUSCULAR; INTRAVENOUS
Status: COMPLETED | OUTPATIENT
Start: 2023-09-08 | End: 2023-09-08

## 2023-09-08 RX ORDER — SODIUM CHLORIDE 9 MG/ML
INJECTION, SOLUTION INTRAVENOUS PRN
Status: DISCONTINUED | OUTPATIENT
Start: 2023-09-08 | End: 2023-09-10 | Stop reason: HOSPADM

## 2023-09-08 RX ORDER — CALCIUM GLUCONATE 94 MG/ML
1000 INJECTION, SOLUTION INTRAVENOUS PRN
Status: DISCONTINUED | OUTPATIENT
Start: 2023-09-08 | End: 2023-09-10 | Stop reason: HOSPADM

## 2023-09-08 RX ORDER — SODIUM CHLORIDE 0.9 % (FLUSH) 0.9 %
5-40 SYRINGE (ML) INJECTION EVERY 12 HOURS SCHEDULED
Status: DISCONTINUED | OUTPATIENT
Start: 2023-09-09 | End: 2023-09-10 | Stop reason: HOSPADM

## 2023-09-08 RX ORDER — LABETALOL HYDROCHLORIDE 5 MG/ML
40 INJECTION, SOLUTION INTRAVENOUS
Status: COMPLETED | OUTPATIENT
Start: 2023-09-08 | End: 2023-09-09

## 2023-09-08 RX ORDER — LABETALOL HYDROCHLORIDE 5 MG/ML
20 INJECTION, SOLUTION INTRAVENOUS
Status: COMPLETED | OUTPATIENT
Start: 2023-09-08 | End: 2023-09-08

## 2023-09-08 RX ORDER — LABETALOL 200 MG/1
200 TABLET, FILM COATED ORAL EVERY 12 HOURS SCHEDULED
Status: DISCONTINUED | OUTPATIENT
Start: 2023-09-09 | End: 2023-09-10 | Stop reason: HOSPADM

## 2023-09-08 RX ORDER — HYDRALAZINE HYDROCHLORIDE 20 MG/ML
10 INJECTION INTRAMUSCULAR; INTRAVENOUS
Status: COMPLETED | OUTPATIENT
Start: 2023-09-08 | End: 2023-09-09

## 2023-09-08 RX ORDER — SODIUM CHLORIDE, SODIUM LACTATE, POTASSIUM CHLORIDE, CALCIUM CHLORIDE 600; 310; 30; 20 MG/100ML; MG/100ML; MG/100ML; MG/100ML
INJECTION, SOLUTION INTRAVENOUS CONTINUOUS
Status: DISCONTINUED | OUTPATIENT
Start: 2023-09-09 | End: 2023-09-10 | Stop reason: HOSPADM

## 2023-09-08 RX ORDER — LABETALOL HYDROCHLORIDE 5 MG/ML
40 INJECTION, SOLUTION INTRAVENOUS
Status: COMPLETED | OUTPATIENT
Start: 2023-09-08 | End: 2023-09-08

## 2023-09-08 RX ORDER — SODIUM CHLORIDE 0.9 % (FLUSH) 0.9 %
5-40 SYRINGE (ML) INJECTION PRN
Status: DISCONTINUED | OUTPATIENT
Start: 2023-09-08 | End: 2023-09-10 | Stop reason: HOSPADM

## 2023-09-08 RX ORDER — LABETALOL HYDROCHLORIDE 5 MG/ML
80 INJECTION, SOLUTION INTRAVENOUS
Status: COMPLETED | OUTPATIENT
Start: 2023-09-08 | End: 2023-09-09

## 2023-09-08 RX ORDER — LABETALOL HYDROCHLORIDE 5 MG/ML
80 INJECTION, SOLUTION INTRAVENOUS
Status: COMPLETED | OUTPATIENT
Start: 2023-09-08 | End: 2023-09-08

## 2023-09-08 RX ORDER — MAGNESIUM SULFATE HEPTAHYDRATE 40 MG/ML
4000 INJECTION, SOLUTION INTRAVENOUS ONCE
Status: COMPLETED | OUTPATIENT
Start: 2023-09-09 | End: 2023-09-09

## 2023-09-08 RX ADMIN — LABETALOL HYDROCHLORIDE 20 MG: 5 INJECTION, SOLUTION INTRAVENOUS at 15:21

## 2023-09-08 RX ADMIN — LABETALOL HYDROCHLORIDE 20 MG: 5 INJECTION, SOLUTION INTRAVENOUS at 23:57

## 2023-09-08 RX ADMIN — NIFEDIPINE 30 MG: 30 TABLET, EXTENDED RELEASE ORAL at 20:27

## 2023-09-08 RX ADMIN — LABETALOL HYDROCHLORIDE 80 MG: 5 INJECTION INTRAVENOUS at 15:44

## 2023-09-08 RX ADMIN — HYDRALAZINE HYDROCHLORIDE 10 MG: 20 INJECTION, SOLUTION INTRAMUSCULAR; INTRAVENOUS at 16:23

## 2023-09-08 RX ADMIN — LABETALOL HYDROCHLORIDE 40 MG: 5 INJECTION, SOLUTION INTRAVENOUS at 15:32

## 2023-09-08 NOTE — PROGRESS NOTES
S/P Primary 8/28 and went home with normal BP  Pt C/O Severe ha last day with some scotoma, ruq pain elevated Bps at home 160-180/100's    Vitals:    09/08/23 1430 09/08/23 1431   BP: (!) 183/122 (!) 188/126   Site: Left Upper Arm Right Upper Arm   Weight: 157 lb 6.4 oz (71.4 kg)    Height: 5' 2\" (1.575 m)           DTR 2+/4  No clonus  No edema.        A.  Post partum hypertension/preeclampsia  P  To L&D for BP control & Labs

## 2023-09-08 NOTE — H&P
Obstetrics History and Physical    Name: Pat Segura MRN: [id number] [id number]: [id number]    YOB: 1990  Age: 28 y.o. Sex: female       Subjective:      Chief complaint: headache    Beau Peralta is a 28 y.o.   female with a history of elective term  section on 23 presents with complaints of headache and elevated BP at home of 163/109 and 149/107. Pt reports some scotomata, and some RUQ pain but states the pain predated her delivery. She denies edema. On arrival to the office today she was noted to have BP of 188/122, 187/115. Urine dip in the office was negative for protein. Problem List:  Patient Active Problem List    Diagnosis Date Noted    Postpartum hypertension 2023    38 weeks gestation of pregnancy 2023    Full-term premature rupture of membranes with onset of labor within 24 hours of rupture 2023    External hemorrhoid 2023    Elevated glucose tolerance test 06/15/2023     Overview Note:     143. Pt saw PCP just after the test had A1c 5.1 and FBS 82. Does not need 3hr. Would continue to limit sweets. Anemia affecting pregnancy 2023     Overview Note:     10.6 Slow FE          Group B streptococcal bacteriuria 2023    Family history of breast cancer 2023     Overview Note:     Father 54    Was not brca tested.    His 7 brothers were Brca neg  Pt needs testing          Hypothyroid 2016    Genital herpes 2014     Past Medical History:   Diagnosis Date    Anemia     GBS carrier     Herpes simplex virus (HSV) infection     Hypothyroid     Pilonidal cyst     Rectus diastasis 2023    12 cm separation saw PT     Past Surgical History:   Procedure Laterality Date     SECTION N/A 2023     SECTION performed by Manuel Mckinley MD at [facility] OR       OB History    Para Term  AB Living   2 1 1 0 1 1   SAB IAB Ectopic Molar Multiple Live Births   1 0 0 0 0 1      # Outcome

## 2023-09-08 NOTE — PROGRESS NOTES
Gokul Abarca is a 28 y.o. female returns for a routine post-partum follow-up visit     Chief Complaint   Patient presents with    Postpartum Care             Type of delivery: primary  section  Date of Delivery: 2023  Breastfeeding: no  Bleeding Resolved: no  Last Pap: normal obtained . Problems: Having headaches and BP elevated. 1. Have you been to the ER, urgent care clinic, or hospitalized since your last visit? No    2. Have you seen or consulted any other health care providers outside of the [address] [place] [address] Avenue since your last visit? No    Examination chaperoned by Raul Fletcher CMA.

## 2023-09-08 NOTE — PROGRESS NOTES
1450:Patient arrived to L&D room 216, Dr. Blossom Mishra called patient over for elevated Bps and orders for serial Bps and labetalol protocol if BP in the severe range. Pt delivered via  . Pt reports her BP was elevated at home this morning as well as in the office. Pt also reports a dull headache and middle upper quadrant pain. Pt oriented to room and unit, plan of care discussed with pt, pt verbalized understanding. 1525: Dr. Blossom Mishra updated on severe range BP and that labetalol protocol was started. 1615: Dr. Blossom Mishra given update on pt's lab results as well as BP with going through the labetalol protocol. MD stated if pt's BP becomes severe range again to give 10mg of Hydralazine IVP. 1715: Dr. Blossom Mishra given update on pt. MD stated he will start pt on procardia 30mg XL BID starting tonight. 1720: Dr. Blossom Mishra at bedside discussing plan of care with pt for overnight observation. BP noted to be severe while MD at bedside, pt visibly upset. MD stated will recheck BP and if severe range again will give 20mg hydralazine IVP. 1900: Bedside and Verbal shift change report given to Benjamin Casas (oncoming nurse) by Bushra Mcmullen RN (offgoing nurse). Report included the following information Nurse Handoff Report, Index, MAR, Recent Results, and Med Rec Status.

## 2023-09-09 PROCEDURE — 96376 TX/PRO/DX INJ SAME DRUG ADON: CPT

## 2023-09-09 PROCEDURE — 99232 SBSQ HOSP IP/OBS MODERATE 35: CPT | Performed by: FAMILY MEDICINE

## 2023-09-09 PROCEDURE — 6370000000 HC RX 637 (ALT 250 FOR IP): Performed by: OBSTETRICS & GYNECOLOGY

## 2023-09-09 PROCEDURE — G0378 HOSPITAL OBSERVATION PER HR: HCPCS

## 2023-09-09 PROCEDURE — 1100000000 HC RM PRIVATE

## 2023-09-09 PROCEDURE — 2580000003 HC RX 258: Performed by: OBSTETRICS & GYNECOLOGY

## 2023-09-09 PROCEDURE — 6360000002 HC RX W HCPCS: Performed by: OBSTETRICS & GYNECOLOGY

## 2023-09-09 PROCEDURE — 96365 THER/PROPH/DIAG IV INF INIT: CPT

## 2023-09-09 PROCEDURE — 96375 TX/PRO/DX INJ NEW DRUG ADDON: CPT

## 2023-09-09 PROCEDURE — 6360000002 HC RX W HCPCS: Performed by: FAMILY MEDICINE

## 2023-09-09 PROCEDURE — 96366 THER/PROPH/DIAG IV INF ADDON: CPT

## 2023-09-09 RX ORDER — ONDANSETRON 2 MG/ML
4 INJECTION INTRAMUSCULAR; INTRAVENOUS EVERY 6 HOURS PRN
Status: DISCONTINUED | OUTPATIENT
Start: 2023-09-09 | End: 2023-09-10 | Stop reason: HOSPADM

## 2023-09-09 RX ADMIN — SODIUM CHLORIDE, POTASSIUM CHLORIDE, SODIUM LACTATE AND CALCIUM CHLORIDE: 600; 310; 30; 20 INJECTION, SOLUTION INTRAVENOUS at 00:00

## 2023-09-09 RX ADMIN — MAGNESIUM SULFATE IN WATER 2000 MG/HR: 20 INJECTION, SOLUTION INTRAVENOUS at 00:45

## 2023-09-09 RX ADMIN — MAGNESIUM SULFATE HEPTAHYDRATE 4000 MG: 40 INJECTION, SOLUTION INTRAVENOUS at 00:14

## 2023-09-09 RX ADMIN — MAGNESIUM SULFATE IN WATER 2000 MG/HR: 20 INJECTION, SOLUTION INTRAVENOUS at 09:13

## 2023-09-09 RX ADMIN — LABETALOL HYDROCHLORIDE 40 MG: 5 INJECTION, SOLUTION INTRAVENOUS at 00:13

## 2023-09-09 RX ADMIN — MAGNESIUM SULFATE IN WATER 2000 MG/HR: 20 INJECTION, SOLUTION INTRAVENOUS at 18:37

## 2023-09-09 RX ADMIN — LABETALOL HYDROCHLORIDE 200 MG: 200 TABLET, FILM COATED ORAL at 09:20

## 2023-09-09 RX ADMIN — ONDANSETRON 4 MG: 2 INJECTION INTRAMUSCULAR; INTRAVENOUS at 07:57

## 2023-09-09 RX ADMIN — LABETALOL HYDROCHLORIDE 80 MG: 5 INJECTION INTRAVENOUS at 00:31

## 2023-09-09 RX ADMIN — ONDANSETRON 4 MG: 2 INJECTION INTRAMUSCULAR; INTRAVENOUS at 14:30

## 2023-09-09 RX ADMIN — HYDRALAZINE HYDROCHLORIDE 10 MG: 20 INJECTION, SOLUTION INTRAMUSCULAR; INTRAVENOUS at 00:49

## 2023-09-09 RX ADMIN — ONDANSETRON 4 MG: 2 INJECTION INTRAMUSCULAR; INTRAVENOUS at 00:48

## 2023-09-09 NOTE — PROGRESS NOTES
0700 Bedside and Verbal shift change report given to Reyna Lemus RN (oncoming nurse) by Hubert Mcintyre RN (offgoing nurse). Report included the following information Nurse Handoff Report, Index, Adult Overview, Surgery Report, Intake/Output, MAR, and Recent Results. 0845 pt called out c/o IV site swelling. RN to bedside to assess, IV infiltrated at this time. This RN unable to obtain further access, RRT RN called at this time   0913 RRT RN at bedside, IV access obtained. Magnesium restarted at this time    1900 Bedside and Verbal shift change report given to [address] [place] [address] Idris (oncoming nurse) by Reyna Lemus RN (offgoing nurse). Report included the following information Nurse Handoff Report, Index, Adult Overview, Surgery Report, Intake/Output, MAR, and Recent Results.

## 2023-09-09 NOTE — PROGRESS NOTES
L&D Progress Note    Admit Date: 9/8/2023      Subjective:     Notified by nursing of patient complaining of headache and nausea since Nifedipine XL 30mg administration. Patient states her headache had resolved by the time she came in today but resumed since bp med. Declines HA or nausea meds.          Objective:     Patient Vitals for the past 8 hrs:   BP Pulse Resp SpO2   09/08/23 2340 (!) 167/94 70 -- --   09/08/23 2326 (!) 166/94 72 -- --   09/08/23 2308 -- 69 -- 92 %   09/08/23 2307 (!) 156/88 75 18 98 %   09/08/23 2130 (!) 147/84 75 -- --   09/08/23 2027 139/88 85 -- --   09/08/23 1937 -- 88 -- 90 %   09/08/23 1936 (!) 149/88 83 18 98 %   09/08/23 1857 (!) 150/91 76 -- 93 %   09/08/23 1852 -- 77 -- 97 %   09/08/23 1847 -- 87 -- 98 %   09/08/23 1842 (!) 144/86 97 -- 98 %   09/08/23 1837 -- 88 -- 98 %   09/08/23 1832 -- 77 -- 98 %   09/08/23 1827 (!) 147/83 78 -- 98 %   09/08/23 1822 -- 74 -- 98 %   09/08/23 1817 -- 74 -- 98 %   09/08/23 1812 -- -- -- 99 %   09/08/23 1811 (!) 145/86 74 -- --   09/08/23 1807 -- 76 -- 99 %   09/08/23 1802 -- -- -- 98 %   09/08/23 1801 (!) 149/83 72 -- --   09/08/23 1751 (!) 143/82 77 -- --   09/08/23 1747 -- 81 -- 99 %   09/08/23 1741 (!) 147/83 79 -- 93 %   09/08/23 1737 -- 85 -- 99 %   09/08/23 1732 -- -- -- 99 %   09/08/23 1731 (!) 147/83 86 -- --   09/08/23 1727 -- 81 -- 99 %   09/08/23 1722 -- -- -- 98 %   09/08/23 1721 (!) 165/97 92 -- --   09/08/23 1717 -- 83 -- 99 %   09/08/23 1712 -- -- -- 98 %   09/08/23 1711 138/82 84 -- --   09/08/23 1707 -- 85 -- 99 %   09/08/23 1702 -- 94 -- 98 %   09/08/23 1701 (!) 147/86 86 -- --   09/08/23 1657 -- 89 -- 99 %   09/08/23 1652 -- -- -- 99 %   09/08/23 1651 139/83 81 -- --   09/08/23 1647 -- 85 -- 99 %   09/08/23 1642 -- -- -- 99 %   09/08/23 1641 137/73 79 -- --   09/08/23 1637 -- 81 -- 100 %   09/08/23 1631 (!) 143/87 84 -- 99 %   09/08/23 1626 -- 77 -- 98 %   09/08/23 1621 (!) 160/97 74 -- 99 %   09/08/23 1616 -- 79 -- 99 %

## 2023-09-09 NOTE — PROGRESS NOTES
Antepartum Progress Note    Subjective: Doing okay, feels miserable - tired, some epigastric abdominal pain. Headache and nausea have gone but just feels weak. States has had spots in vision and headache for a couple days before getting here. Has been getting up to void frequently. Bleeding present but minimal, is still red. Objective:   Vitals:    23 1516   BP: 119/68   Pulse: 80   Resp: 16   Temp:    SpO2: 99%     Gen: lying flat, tired appearing, NAD  Cardio: normal rate  Pulm: No respiratory distress  Abd: incision with dermabond in place, appears to be healing well, no surrounding erythema  mild epigastric abdominal TTP   Ext: no edema   Neuro: normal reflexes, no clonus     Assessment and Plan:  Charly Darling is a 28 y.o.  POD#12 from [facility] who was admitted for postpartum hypertension and subsequently developed severe range pressures and symptoms consistent with postpartum preeclampsia. Postpartum Preeclampsia: Severe range pressures and headache, Mg++ started overnight along with antihypertensives. Will continue Mg++ for 24-hours, continue labetalol 200mg BID. BP now in normal/low range. UPC 0.3 (not a cath sample), Cr 0.71, AST/ALT wnl, plt 435. No signs/symptoms of Mg++ toxicity.    -- continue regular Mg++ checks  -- Mg++ off around midnight, will monitor BP and hopefully able to discharge tomorrow    Routine Postpartum Care  -- continue pain control prn  -- bleeding okay and incision healing well  -- formula feeding, infant at home with family     Mandeep Chciasst, [address]

## 2023-09-09 NOTE — PLAN OF CARE
Problem: Safety - Adult  Goal: Free from fall injury  9/8/2023 2104 by Moo Hale RN  Outcome: Progressing  9/8/2023 1604 by Saima Barahona RN  Outcome: Progressing     Problem: Pain  Goal: Verbalizes/displays adequate comfort level or baseline comfort level  Outcome: Progressing

## 2023-09-09 NOTE — PROGRESS NOTES
1907: Bedside and Verbal shift change report given to AbhijeetRaisa Brittney Betancourt (oncoming nurse) by Titus Harding RN (offgoing nurse). Report included the following information Nurse Handoff Report, MAR, Recent Results, and Med Rec Status. 1945: This RN updated MD Anny Salazar updated of patient's current status. No further orders tat this time. 2327: This RN called MD Anny Salazar regarding patient's current blood pressures and patient's complaint of ongoing HA and nausea. MD stated that she would discontinue her ordered procardia and change her to labetalol. MD stated that she would be at patient's bedside shortly to discuss patient POC and discuss with patient about starting Magnesium infusion. [phone]: This RN and MD Anny Salazar at patient's beside for patient education and to discuss POC. MD VORB this RN to start labetalol protocol and magnesium sulfate administration protocol. No further orders at this time. 0000: This RN at patient bedside to begin labetalol protocol and magnesium sulfate administration per MD orders. 0145: This RN updated MD Anny Salazar on patient's current status and BP. No future orders at this time. 0700: Bedside and Verbal shift change report given to TEE JIANG (oncoming nurse) by Keila Cheatham RN (offgoing nurse). Report included the following information Nurse Handoff Report, MAR, Recent Results, and Med Rec Status.

## 2023-09-10 VITALS
RESPIRATION RATE: 16 BRPM | OXYGEN SATURATION: 99 % | DIASTOLIC BLOOD PRESSURE: 75 MMHG | HEART RATE: 75 BPM | SYSTOLIC BLOOD PRESSURE: 118 MMHG | TEMPERATURE: 98.3 F

## 2023-09-10 PROCEDURE — 99238 HOSP IP/OBS DSCHRG MGMT 30/<: CPT | Performed by: FAMILY MEDICINE

## 2023-09-10 PROCEDURE — 6370000000 HC RX 637 (ALT 250 FOR IP): Performed by: OBSTETRICS & GYNECOLOGY

## 2023-09-10 RX ORDER — LABETALOL 200 MG/1
200 TABLET, FILM COATED ORAL EVERY 12 HOURS SCHEDULED
Qty: 60 TABLET | Refills: 0 | Status: SHIPPED | OUTPATIENT
Start: 2023-09-10

## 2023-09-10 RX ADMIN — LABETALOL HYDROCHLORIDE 200 MG: 200 TABLET, FILM COATED ORAL at 09:03

## 2023-09-10 NOTE — DISCHARGE INSTRUCTIONS
Check blood pressure twice a day prior to taking labetalol. If blood pressure < 120/80, you do not need to take your medicine. Check blood pressure if you have any symptoms such as headache, vision changes, shortness of breath, right-sided abdominal pain or excess swelling. Call your OB office with any elevated blood pressures over 150/100. Stay hydrated and make sure you are getting regular meals with good nutrients - proteins, healthy fats. After Your Delivery (the Postpartum Period): Care Instructions  Overview     Congratulations on the birth of your baby. Like pregnancy, the  period can be a time of excitement, carlo, and exhaustion. You may look at your wondrous little baby and feel happy. You may also be overwhelmed by your new sleep hours and new responsibilities. At first, babies often sleep during the days and are awake at night. They do not have a pattern or routine. They may make sudden gasps, jerk themselves awake, or look like they have crossed eyes. These are all normal, and they may even make you smile. In these first weeks after delivery, try to take good care of yourself. It may take 4 to 6 weeks to feel like yourself again, and possibly longer if you had a  birth. You will likely feel very tired for several weeks. Your days will be full of ups and downs, but lots of carlo as well. Follow-up care is a key part of your treatment and safety. Be sure to make and go to all appointments, and call your doctor if you are having problems. It's also a good idea to know your test results and keep a list of the medicines you take. How can you care for yourself at home? Take care of your body after delivery  Use pads instead of tampons for the bloody flow that may last as long as 2 weeks. Ease cramps with ibuprofen (Advil, Motrin). Ease soreness of hemorrhoids and the area between your vagina and rectum with ice compresses or witch hazel pads.   Ease constipation by drinking lots of

## 2023-09-10 NOTE — PROGRESS NOTES
1900: SBAR report received from [address] [place] [address], care assumed at this time. 0017: Magnesium turned off at this time. 0700: SBAR report given to Luca Bazzi RN, care relinquished at this time.

## 2023-09-10 NOTE — PROGRESS NOTES
0700 - Verbal shift change report given to FLAQUITA DOMÍNGUEZ RN (oncoming nurse) by Jet Mirza RN (offgoing nurse). Report included the following information Nurse Handoff Report. 0900 - Dr. Ezra Maria in room to assess patient. Patient allowed to be discharged home after blood pressure is checked at 1100.     1115 - Discharge instructions given and explained to patient. Patient ambulated safely off unit.

## 2023-09-10 NOTE — DISCHARGE SUMMARY
Obstetrical Discharge Summary     Name: De Centeno MRN: [id number]  SSN: [id number]    YOB: 1990  Age: 28 y.o. Sex: female      Admit Date: 2023    Discharge Date: 9/10/2023     Admitting Physician: Familia Dueñas MD   Discharging Physician: Liudmila Oliveira DO  Attending Physician:  No att. providers found     * Admission/Discharge Diagnoses: Pre-eclampsia in postpartum period [O14.95]  Preeclampsia in postpartum period [O14.95]    * Discharge Condition: stable    * Hospital Course: Michell Mandujano is a 31yo  who POD#13 from [facility] who was readmitted for postpartum preeclampsia. She was symptomatic with headache, visual changes and severe range BP and was started on Mg++ which was continued for 24-hours. Her BP dramatically improved following the IV hydralazine protocol, and she was continued on scheduled labetalol. Symptoms resolved this morning, has been off Mg++ since midnight and BP in normal range. Advised to continue labetalol on discharge until follow-up, hold parameters and return precautions reviewed. Otherwise, doing wel from postpartum/post-op standpoint.      Vitals:    09/10/23 1102   BP: 118/75   Pulse: 75   Resp:    Temp:    SpO2:      Gen: well-appearing, NAD  Cardio: RRR, no murmurs  Pulm: CTAB, no crackles  Abd: incision C/D/I, dermabond in place  non-tender  Ext: no edema     Lab Results   Component Value Date     2023    K 4.1 2023     (H) 2023    CO2 23 2023    BUN 16 2023    CREATININE 0.71 2023    GLUCOSE 85 2023    CALCIUM 9.6 2023    PROT 7.4 2023    LABALBU 3.4 (L) 2023    BILITOT 0.2 2023    ALKPHOS 160 (H) 2023    AST 21 2023    ALT 31 2023    LABGLOM >60 2023    GLOB 4.0 2023     Lab Results   Component Value Date    WBC 11.1 (H) 2023    HGB 11.3 (L) 2023    HCT 34.9 (L) 2023    MCV 92.1 2023     (H) 2023     *

## 2023-09-11 ENCOUNTER — OFFICE VISIT (OUTPATIENT)
Age: 33
End: 2023-09-11

## 2023-09-11 VITALS
HEIGHT: 62 IN | DIASTOLIC BLOOD PRESSURE: 96 MMHG | WEIGHT: 153.8 LBS | SYSTOLIC BLOOD PRESSURE: 146 MMHG | BODY MASS INDEX: 28.3 KG/M2

## 2023-09-11 DIAGNOSIS — F41.9 ANXIETY: ICD-10-CM

## 2023-09-11 PROCEDURE — 0503F POSTPARTUM CARE VISIT: CPT | Performed by: OBSTETRICS & GYNECOLOGY

## 2023-09-11 RX ORDER — ALPRAZOLAM 0.5 MG/1
0.5 TABLET ORAL 3 TIMES DAILY PRN
Qty: 40 TABLET | Refills: 0 | Status: SHIPPED | OUTPATIENT
Start: 2023-09-11 | End: 2023-10-11

## 2023-09-11 RX ORDER — HYDROCHLOROTHIAZIDE 25 MG/1
25 TABLET ORAL EVERY MORNING
Qty: 30 TABLET | Refills: 5 | Status: SHIPPED | OUTPATIENT
Start: 2023-09-11

## 2023-09-11 NOTE — PROGRESS NOTES
PROBLEM VISIT      Michael Melendrez is 28y.o. year old White (non-)  female who presents for follow up of blood pressure now 14 days post op. She was sent home from the hospital yesterday on Labetalol 200 BID. She has not had any headaches but does feel like her right eye is \"heavy\" since starting on magnesium (stopped Sat 1159pm). Still having occasional gush of blood after sitting or lying down. Appetite isn't the best.  No edema. Does feel like symptoms are worsened by anxiety and this whole thing has caused her to feel very anxious.  after getting home 122/83 and 139/90  Monday am 154/97 (but was at 8am and due for dose at 9am). Vitals:    23 1406   BP: (!) 146/96      Abd -- incision C/D/I  FF 2cm below umb   Ext - neg edema DTR 2+/4 no clonus    ASSESSMENT:   Diagnosis Orders   1. Preeclampsia in postpartum period        2. Anxiety  ALPRAZolam (XANAX) 0.5 MG tablet          PLAN:  Continue TID BP check. My chart results  Add HCTZ 25 mg daily  Rx Xanax. Rest amap  Pree prec   Return in about 1 week (around 2023).

## 2023-09-11 NOTE — PROGRESS NOTES
Sukhwinder Hair is a 28 y.o. female returns for a routine post-partum follow-up visit     Chief Complaint   Patient presents with    Postpartum Care       Type of delivery: primary  section  Date of Delivery: 2023  Breastfeeding: no            Problems: BP at home 145/100 left arm. 1. Have you been to the ER, urgent care clinic, or hospitalized since your last visit? No    2. Have you seen or consulted any other health care providers outside of the [address] [place] [address] Avenue since your last visit? No    Examination chaperoned by Derrick Magallanes CMA.

## 2023-09-19 ENCOUNTER — PATIENT MESSAGE (OUTPATIENT)
Age: 33
End: 2023-09-19

## 2023-09-19 DIAGNOSIS — B37.31 ACUTE CANDIDIASIS OF VULVA AND VAGINA: Primary | ICD-10-CM

## 2023-09-26 ENCOUNTER — PATIENT MESSAGE (OUTPATIENT)
Age: 33
End: 2023-09-26

## 2023-09-26 RX ORDER — LABETALOL 200 MG/1
400 TABLET, FILM COATED ORAL EVERY 12 HOURS SCHEDULED
Qty: 120 TABLET | Refills: 1 | Status: SHIPPED | OUTPATIENT
Start: 2023-09-26 | End: 2023-11-25

## 2023-10-13 ENCOUNTER — OFFICE VISIT (OUTPATIENT)
Age: 33
End: 2023-10-13

## 2023-10-13 VITALS
HEIGHT: 62 IN | SYSTOLIC BLOOD PRESSURE: 131 MMHG | WEIGHT: 147.6 LBS | BODY MASS INDEX: 27.16 KG/M2 | DIASTOLIC BLOOD PRESSURE: 86 MMHG

## 2023-10-13 NOTE — PROGRESS NOTES
Gokul Abarca is a 28 y.o. female returns for a routine post-partum follow-up visit     Chief Complaint   Patient presents with    Postpartum Care       Postpartum Depression: Medium Risk (2023)    Momence  Depression Scale     Last EPDS Total Score: 5     Last EPDS Self Harm Result: Never     Current EPDS score:  7    Type of delivery: primary  section  Date of Delivery: 2023  Breastfeeding: no  Bleeding Resolved: yes  Birth Control: none. Last Pap: normal obtained 2022. Problems: no problems    1. Have you been to the ER, urgent care clinic, or hospitalized since your last visit? No    2. Have you seen or consulted any other health care providers outside of the [address] [place] [address] Avenue since your last visit? No    Examination chaperoned by Raul Fletcher CMA.

## 2023-10-13 NOTE — PROGRESS NOTES
POSTPARTUM  SECTION     De Centeno is a 28 y.o. female returns for a routine post-operative follow-up visit following  Section. This was done on 2023  (about 46  days ago). She is doing well. Feeling much better. BP's  115-120/78-85  Doing pelvic floor PT     Her baby is doing well. Bleeding has mostly stopped. She has had the following significant problems since her delivery: none    The patient is bottle feeding without difficulty. The patient would like to use nothing for birth control. She is currently taking:   hydroCHLOROthiazide  labetalol  NP Thyroid Tabs     EPDS score is: 7    She is due for her next pap in 2 months. Problem List:  Patient Active Problem List    Diagnosis Date Noted    Postpartum hypertension 2023    Preeclampsia in postpartum period 2023    38 weeks gestation of pregnancy 2023    Full-term premature rupture of membranes with onset of labor within 24 hours of rupture 2023    External hemorrhoid 2023    Elevated glucose tolerance test 06/15/2023     Overview Note:     143. Pt saw PCP just after the test had A1c 5.1 and FBS 82. Does not need 3hr. Would continue to limit sweets. Anemia affecting pregnancy 2023     Overview Note:     10.6 Slow FE        Group B streptococcal bacteriuria 2023    Family history of breast cancer 2023     Overview Note:     Father 54    Was not brca tested.    His 7 brothers were Brca neg  Pt needs testing        Hypothyroid 2016    Genital herpes 2014     Past Medical History:   Diagnosis Date    Anemia     GBS carrier     Herpes simplex virus (HSV) infection     Hypothyroid     Pilonidal cyst     Rectus diastasis 2023    12 cm separation saw PT     Past Surgical History:   Procedure Laterality Date     SECTION N/A 2023     SECTION performed by Familia Dueñas MD at [facility]       OB History   Denise Gut

## 2023-10-27 ENCOUNTER — OFFICE VISIT (OUTPATIENT)
Age: 33
End: 2023-10-27

## 2023-10-27 VITALS
OXYGEN SATURATION: 98 % | RESPIRATION RATE: 16 BRPM | BODY MASS INDEX: 27.64 KG/M2 | TEMPERATURE: 97.7 F | SYSTOLIC BLOOD PRESSURE: 130 MMHG | HEIGHT: 62 IN | DIASTOLIC BLOOD PRESSURE: 86 MMHG | HEART RATE: 78 BPM | WEIGHT: 150.2 LBS

## 2023-10-27 DIAGNOSIS — L02.12 FURUNCLE OF NECK: Primary | ICD-10-CM

## 2023-10-27 RX ORDER — CEPHALEXIN 500 MG/1
500 CAPSULE ORAL 4 TIMES DAILY
Qty: 28 CAPSULE | Refills: 0 | Status: SHIPPED | OUTPATIENT
Start: 2023-10-27 | End: 2023-11-03

## 2023-10-27 RX ORDER — HYDROCORTISONE 25 MG/G
2.5 CREAM TOPICAL
COMMUNITY
Start: 2023-09-13

## 2023-10-27 ASSESSMENT — ENCOUNTER SYMPTOMS: COLOR CHANGE: 1

## 2023-10-27 NOTE — PATIENT INSTRUCTIONS
Treat for oral antibiotics 4x daily  for 1 week. Mupirocin ointment daily for 1 week if spontaneously starts draining. Recommend warm compresses 4x daily.   F/U for new or worsening symptoms

## 2023-10-27 NOTE — PROGRESS NOTES
Subjective:      Patient ID: Chong Young is a 28 y.o. female. History provided by:  Patient  Mass  Location:  Left neck  Severity:  Moderate  Onset quality:  Sudden  Duration:  3 days  Timing:  Constant  Progression:  Worsening  Chronicity:  New  Context:  Red, swollen, pain  Associated symptoms: no fever and no myalgias    Risk factors:  8 weeks postpartum (pt is not nursing). Review of Systems   Constitutional:  Negative for activity change and fever. Musculoskeletal:  Negative for myalgias. Skin:  Positive for color change. Negative for pallor and wound. Papule on neck       Objective:   Physical Exam  Constitutional:       Appearance: Normal appearance. She is not ill-appearing. Cardiovascular:      Rate and Rhythm: Normal rate and regular rhythm. Heart sounds: Normal heart sounds. Pulmonary:      Breath sounds: Normal breath sounds. Skin:     Findings: Ecchymosis and lesion present. Comments: Moderate induration 2x 2cm on left lateral neck   Neurological:      Mental Status: She is alert. Psychiatric:         Mood and Affect: Mood normal.         Behavior: Behavior normal.         Assessment:      Encounter Diagnosis   Name Primary? Furuncle of neck Yes           Plan:      Treat for oral antibiotics 4x daily  for 1 week. Mupirocin ointment daily for 1 week if spontaneously starts draining. Recommend warm compresses 4x daily.   F/U for new or worsening symptoms         MEGAN Gutierrez NP

## 2023-10-31 ENCOUNTER — TELEPHONE (OUTPATIENT)
Age: 33
End: 2023-10-31

## 2023-10-31 NOTE — TELEPHONE ENCOUNTER
----- Message from Jorge A Crowder sent at 10/30/2023 12:12 PM EDT -----  Subject: Appointment Request    Reason for Call: New Patient/New to Provider Appointment needed: New   Patient Request Appointment    QUESTIONS    Reason for appointment request? Requested Provider unavailable - Lillie Lennox     Additional Information for Provider? PT called to schedule NP appt. Saw   Colin yan while ago in hospital and told to call and schedule for her   and her . Please call to f/u.  Can leave VM.  ---------------------------------------------------------------------------  --------------  Elza CASTRO  [phone]; OK to leave message on voicemail  ---------------------------------------------------------------------------  --------------  SCRIPT ANSWERS

## 2023-12-22 LAB — HBA1C MFR BLD HPLC: 5.1 %

## 2024-01-03 ENCOUNTER — OFFICE VISIT (OUTPATIENT)
Age: 34
End: 2024-01-03
Payer: COMMERCIAL

## 2024-01-03 VITALS
DIASTOLIC BLOOD PRESSURE: 80 MMHG | OXYGEN SATURATION: 98 % | TEMPERATURE: 97.6 F | RESPIRATION RATE: 18 BRPM | SYSTOLIC BLOOD PRESSURE: 114 MMHG | HEIGHT: 62 IN | BODY MASS INDEX: 27.23 KG/M2 | WEIGHT: 148 LBS | HEART RATE: 90 BPM

## 2024-01-03 DIAGNOSIS — E03.9 HYPOTHYROIDISM, UNSPECIFIED TYPE: Primary | ICD-10-CM

## 2024-01-03 DIAGNOSIS — Z00.00 ENCOUNTER FOR MEDICAL EXAMINATION TO ESTABLISH CARE: ICD-10-CM

## 2024-01-03 DIAGNOSIS — M62.08 RECTUS DIASTASIS: ICD-10-CM

## 2024-01-03 DIAGNOSIS — R76.8 POSITIVE ANA (ANTINUCLEAR ANTIBODY): ICD-10-CM

## 2024-01-03 PROBLEM — Z87.59 HISTORY OF PRE-ECLAMPSIA: Status: ACTIVE | Noted: 2023-09-08

## 2024-01-03 PROBLEM — R82.71 GROUP B STREPTOCOCCAL BACTERIURIA: Status: RESOLVED | Noted: 2023-02-16 | Resolved: 2024-01-03

## 2024-01-03 PROBLEM — Z3A.38 38 WEEKS GESTATION OF PREGNANCY: Status: RESOLVED | Noted: 2023-08-28 | Resolved: 2024-01-03

## 2024-01-03 PROBLEM — O99.019 ANEMIA AFFECTING PREGNANCY: Status: RESOLVED | Noted: 2023-05-23 | Resolved: 2024-01-03

## 2024-01-03 PROBLEM — K64.4 EXTERNAL HEMORRHOID: Status: RESOLVED | Noted: 2023-07-27 | Resolved: 2024-01-03

## 2024-01-03 PROCEDURE — 99203 OFFICE O/P NEW LOW 30 MIN: CPT | Performed by: FAMILY MEDICINE

## 2024-01-03 RX ORDER — LEVOTHYROXINE, LIOTHYRONINE 9.5; 2.25 UG/1; UG/1
30 TABLET ORAL DAILY
Qty: 180 TABLET | Refills: 3 | Status: SHIPPED | OUTPATIENT
Start: 2024-01-03

## 2024-01-03 RX ORDER — LEVOTHYROXINE, LIOTHYRONINE 76; 18 UG/1; UG/1
120 TABLET ORAL DAILY
Qty: 90 TABLET | Refills: 3 | Status: SHIPPED | OUTPATIENT
Start: 2024-01-03

## 2024-01-03 RX ORDER — LEVOTHYROXINE, LIOTHYRONINE 9.5; 2.25 UG/1; UG/1
30 TABLET ORAL DAILY
COMMUNITY
Start: 2023-12-26 | End: 2024-01-03 | Stop reason: SDUPTHER

## 2024-01-03 ASSESSMENT — PATIENT HEALTH QUESTIONNAIRE - PHQ9
SUM OF ALL RESPONSES TO PHQ9 QUESTIONS 1 & 2: 0
SUM OF ALL RESPONSES TO PHQ QUESTIONS 1-9: 0
SUM OF ALL RESPONSES TO PHQ QUESTIONS 1-9: 0
2. FEELING DOWN, DEPRESSED OR HOPELESS: 0
SUM OF ALL RESPONSES TO PHQ QUESTIONS 1-9: 0
1. LITTLE INTEREST OR PLEASURE IN DOING THINGS: 0
SUM OF ALL RESPONSES TO PHQ QUESTIONS 1-9: 0

## 2024-01-03 NOTE — PROGRESS NOTES
[facility] Office Visit     Assessment/ Plan: Sri Cornell is a 33 y.o. female presenting for:    Establish Care - PMH, PSH, family history, social history, medications and allergies were reviewed and updated. Brought recent labs, will scan into chart.     Hypothyroidism - Chronic, symptoms not much improved so was switched to [place] thyroid and now NP thyroid. TSH last checked 12/12/23 - low 0.005, free T4 in normal range 1.15 and T3 121. TPO antibody, thyroglobulin Ab both in normal range.  -- refilled NP Thyroid for total daily dose of 155mg    30 minutes were spent on the day of this encounter both with the patient and in related activities including chart review, care coordination and counseling.     Patient instructions were discussed and/or provided in the AVS. The patient understands and agrees to the plan.    RETURN TO CARE: prn/6 months   No future appointments.      Subjective:  Chief Complaint   Patient presents with    Establish Care     Patent is coming in to establish care. Patient would like a refill on her thyroid medication. No other concerns.      HPI:   Sri Cornell is a 33 y.o. female with PMH of preeclampsia, hypothyroidism, genital herpes, anemia, family history of breast cancer here for establishing care.     History of Preeclampsia  - was on meds but now off, mostly returned to normal     Hypothyroid  - TSH really suppressed  - Synthroid - didn't help feel any better, Amanda Lindo  - NP Thyroid 120 (having T3 made a huge difference)     Anemia in Pregnancy     Joint Pains  - saw Dr. Cooper Davis Chiropractor Neurology     Still doing PT for rectus diastasis   - working out since about 6wk postpartum   - check hormone levels (progesterone would get up/down - was on bioidentical progesterone on it)   - labs checked on day  - birth control - testosterone for partner   - wants to get hormones  tested when dose labs     I have reviewed the patients problem list,

## 2024-01-03 NOTE — PROGRESS NOTES
Sri Cornell is a 33 y.o. female      Chief Complaint   Patient presents with    [facility] [place] [facility]     Patent is coming in to establish care. Patient would like a refill on her thyroid medication. No other concerns.        1. Have you been to the ER, urgent care clinic since your last visit?  Hospitalized since your last visit?No    2. Have you seen or consulted any other health care providers outside of the [facility] since your last visit?  Include any pap smears or colon screening. No    Vitals:    01/03/24 1317   BP: 114/80   Site: Right Upper Arm   Position: Sitting   Pulse: 90   Resp: 18   Temp: 97.6 °F (36.4 °C)   TempSrc: Oral   SpO2: 98%   Weight: 67.1 kg (148 lb)   Height: 1.575 m (5' 2\")            Health Maintenance Due   Topic Date Due    Hepatitis B vaccine (1 of 3 - 3-dose series) Never done    COVID-19 Vaccine (1) Never done    Varicella vaccine (1 of 2 - 2-dose childhood series) Never done    Depression Screen  Never done    Cervical cancer screen  Never done    A1C test (Diabetic or Prediabetic)  12/30/2023         Medication Reconciliation completed, changes noted.  Please  Update medication list.

## 2024-01-08 PROBLEM — M62.08 RECTUS DIASTASIS: Status: ACTIVE | Noted: 2024-01-08

## 2024-01-08 PROBLEM — R76.8 POSITIVE ANA (ANTINUCLEAR ANTIBODY): Status: ACTIVE | Noted: 2024-01-08

## 2024-04-22 ENCOUNTER — PATIENT MESSAGE (OUTPATIENT)
Age: 34
End: 2024-04-22

## 2024-04-22 DIAGNOSIS — E03.9 HYPOTHYROIDISM, UNSPECIFIED TYPE: Primary | ICD-10-CM

## 2024-04-22 DIAGNOSIS — R79.89 LOW SERUM PROGESTERONE: ICD-10-CM

## 2024-04-26 NOTE — PROGRESS NOTES
Sri Cornell is a 33 y.o. female returns for an annual exam     Chief Complaint   Patient presents with    Annual Exam       Patient's last menstrual period was 04/24/2024.  Her periods are moderate in flow and usually regular with a 26-32 day interval with 3-7 day duration.  She does not have dysmenorrhea.  Problems: no problems  Birth Control: condoms sometimes.  Last Pap: normal obtained 2022.  She does not have a history of JABARI 2, 3 or cervical cancer.   With regard to the Gardisil vaccine, she has received all 3 injections      1. Have you been to the ER, urgent care clinic, or hospitalized since your last visit? No    2. Have you seen or consulted any other health care providers outside of the [facility] since your last visit? No    Examination chaperoned by DAVID LOPEZ CMA.

## 2024-04-29 ENCOUNTER — OFFICE VISIT (OUTPATIENT)
Age: 34
End: 2024-04-29
Payer: COMMERCIAL

## 2024-04-29 VITALS
SYSTOLIC BLOOD PRESSURE: 132 MMHG | HEIGHT: 62 IN | DIASTOLIC BLOOD PRESSURE: 84 MMHG | WEIGHT: 139 LBS | BODY MASS INDEX: 25.58 KG/M2

## 2024-04-29 DIAGNOSIS — S03.00XS DISLOCATION OF TEMPOROMANDIBULAR JOINT, SEQUELA: ICD-10-CM

## 2024-04-29 DIAGNOSIS — Z01.419 WELL WOMAN EXAM WITH ROUTINE GYNECOLOGICAL EXAM: Primary | ICD-10-CM

## 2024-04-29 DIAGNOSIS — Z80.3 FAMILY HISTORY OF BREAST CANCER: ICD-10-CM

## 2024-04-29 PROBLEM — Z87.59 HISTORY OF PRE-ECLAMPSIA: Status: RESOLVED | Noted: 2023-09-08 | Resolved: 2024-04-29

## 2024-04-29 PROBLEM — R73.09 ELEVATED GLUCOSE TOLERANCE TEST: Status: RESOLVED | Noted: 2023-06-15 | Resolved: 2024-04-29

## 2024-04-29 PROCEDURE — 99395 PREV VISIT EST AGE 18-39: CPT | Performed by: OBSTETRICS & GYNECOLOGY

## 2024-04-29 NOTE — PROGRESS NOTES
ANNUAL EXAM AGES 18-39    History:  Sri Cornell is a 33 y.o. year old  White (non-) female   Patient's last menstrual period was 2024.    She presents for her annual checkup.     Patient's last menstrual period was 2024.  Her periods are moderate in flow and usually regular with a 28 day interval with 4 day duration.  She does not have dysmenorrhea.  Problems: no problems  Birth Control: condoms sometimes.  Last Pap: normal obtained .  She does not have a history of JABARI 2, 3 or cervical cancer.   With regard to the Gardisil vaccine, she has received all 3 injections    Medical History:  Problem List:  Patient Active Problem List    Diagnosis Date Noted    Rectus diastasis 2024     Overview Note:     Pelvic physical therapy - [facility]      Positive JAS (antinuclear antibody) 2024     Overview Note:     1:40, plus profile all negative (lab rubi)  Celiac testing negative  Low Omega (FA)   IG levels okay   Elevated EBV IgG positive (prior infection  Rheumassure negative   Elevated cardiac CRP (6.57) - relative risk for future cardiovascular event   Elevated glycine A   AM cortisol wnl       History of pre-eclampsia 2023    Elevated glucose tolerance test 06/15/2023     Overview Note:     143.  Pt saw PCP just after the test had A1c 5.1 and FBS 82.  Does not need 3hr.  Would continue to limit sweets.      Family history of breast cancer 2023     Overview Note:     Father 55    Was not brca tested.   His 7 brothers were Brca neg  Pt needs testing        Hypothyroid 2016    Genital herpes 2014     Past Medical History:   Diagnosis Date    Anemia     GBS carrier     Group B streptococcal bacteriuria 2023    Herpes simplex virus (HSV) infection     Hypothyroid     Pilonidal cyst     Postpartum hypertension 2023    Rectus diastasis 2023    12 cm separation saw PT     Past Surgical History:   Procedure Laterality Date

## 2024-05-01 NOTE — TELEPHONE ENCOUNTER
From: Sri Cornell  To: Dr. Mandy De Santiago  Sent: 4/22/2024 9:52 AM EDT  Subject: Labs/ schedule my mom as a new patient     Hi Dr De Santiago,    Would you be able to send Manish and I our labs? We both are set up to see you in the beginning of June. For mine can we also check the reverse t3 and the free t3?     My mom is also in need of a new PCP as hers left the practice she was seeing. She had a TIA on Thanksgiving (we think due to HTN- now well controlled on medication). I tried to call to schedule her an appt with you but they had an issue seeing your schedule and said they would call me back but no one did. Is this something you or your nurse could help me set up?    Thank you so much!!

## 2024-05-15 LAB
Lab: NEGATIVE
Lab: NORMAL

## 2024-05-16 ENCOUNTER — OFFICE VISIT (OUTPATIENT)
Age: 34
End: 2024-05-16

## 2024-05-16 VITALS
OXYGEN SATURATION: 98 % | BODY MASS INDEX: 25.69 KG/M2 | SYSTOLIC BLOOD PRESSURE: 136 MMHG | WEIGHT: 139.6 LBS | DIASTOLIC BLOOD PRESSURE: 96 MMHG | TEMPERATURE: 97.8 F | HEART RATE: 96 BPM | HEIGHT: 62 IN | RESPIRATION RATE: 18 BRPM

## 2024-05-16 DIAGNOSIS — R03.0 ELEVATED BLOOD PRESSURE READING: ICD-10-CM

## 2024-05-16 DIAGNOSIS — M26.609 TMJ (TEMPOROMANDIBULAR JOINT DISORDER): Primary | ICD-10-CM

## 2024-05-16 RX ORDER — PREDNISONE 10 MG/1
TABLET ORAL
Qty: 21 EACH | Refills: 0 | Status: SHIPPED | OUTPATIENT
Start: 2024-05-16

## 2024-05-16 RX ORDER — CYCLOBENZAPRINE HCL 5 MG
5 TABLET ORAL
Qty: 5 TABLET | Refills: 0 | Status: SHIPPED | OUTPATIENT
Start: 2024-05-16 | End: 2024-05-21

## 2024-05-16 ASSESSMENT — ENCOUNTER SYMPTOMS
RESPIRATORY NEGATIVE: 1
ALLERGIC/IMMUNOLOGIC NEGATIVE: 1
EYES NEGATIVE: 1
GASTROINTESTINAL NEGATIVE: 1

## 2024-05-16 NOTE — PROGRESS NOTES
2024   Sri Cornell (: 1990) is a 33 y.o. female, New patient, here for evaluation of the following chief complaint(s):  Otalgia (Pt sx a coupe months ago. Pt dx wit TMJ, suspects flare.Pain from left ear to jaw )     ASSESSMENT/PLAN:  Below is the assessment and plan developed based on review of pertinent history, physical exam, labs, studies, and medications.  1. TMJ (temporomandibular joint disorder)  -     predniSONE 10 MG (21) TBPK; Patient to take 6 tabs on day 1, 5 tabs on day 2, 4 tabs on day 3, 3 tabs on day 4, 2 tabs on day 5, 1 tab on day 6, Disp-21 each, R-0Normal  -     cyclobenzaprine (FLEXERIL) 5 MG tablet; Take 1 tablet by mouth nightly as needed for Muscle spasms, Disp-5 tablet, R-0Normal  2. Elevated blood pressure reading         Handout given with care instructions  2. OTC for symptom management. Increase fluid intake, ensure adequate nutritional intake.  3. Follow up with PCP as needed.  4. Go to ED with development of any acute symptoms.     Follow up:  Return in about 4 weeks (around 2024) for BP Check with medication change.  Follow up immediately for any new, worsening or changes or if symptoms are not improving over the next 5-7 days.     SUBJECTIVE/OBJECTIVE:    Tika Cornell is a 33 y.o. female who complains of left side jaw and ear pain that started 1 month ago. Patient reports pain has worsened over the last few days.Patient has a hx of TMJ. Patient reports she was prescribed in the past that helped improved symptoms. Patient reports she has a dental appt next week to get fitted for a . Patient reports she grinds her teeth at night due to stress. Patient reports taking 800 mg Ibuprofen with no improvement. She denies a history of chest pain, chills, dizziness, fatigue, fevers, myalgias, nausea, shortness of breath, vomiting, and weakness.     There are no diagnoses linked to this encounter.    Otalgia (Pt sx a coupe months ago. Pt dx wit TMJ,

## 2024-06-05 ENCOUNTER — OFFICE VISIT (OUTPATIENT)
Age: 34
End: 2024-06-05
Payer: COMMERCIAL

## 2024-06-05 VITALS
HEIGHT: 62 IN | DIASTOLIC BLOOD PRESSURE: 76 MMHG | SYSTOLIC BLOOD PRESSURE: 117 MMHG | HEART RATE: 81 BPM | WEIGHT: 138 LBS | BODY MASS INDEX: 25.4 KG/M2 | OXYGEN SATURATION: 97 % | RESPIRATION RATE: 18 BRPM | TEMPERATURE: 98.9 F

## 2024-06-05 DIAGNOSIS — Z80.3 FAMILY HISTORY OF BREAST CANCER: ICD-10-CM

## 2024-06-05 DIAGNOSIS — E03.9 HYPOTHYROIDISM, UNSPECIFIED TYPE: Primary | ICD-10-CM

## 2024-06-05 PROCEDURE — 99213 OFFICE O/P EST LOW 20 MIN: CPT | Performed by: FAMILY MEDICINE

## 2024-06-05 ASSESSMENT — PATIENT HEALTH QUESTIONNAIRE - PHQ9
SUM OF ALL RESPONSES TO PHQ QUESTIONS 1-9: 0
2. FEELING DOWN, DEPRESSED OR HOPELESS: NOT AT ALL
1. LITTLE INTEREST OR PLEASURE IN DOING THINGS: NOT AT ALL
SUM OF ALL RESPONSES TO PHQ9 QUESTIONS 1 & 2: 0

## 2024-06-05 NOTE — PROGRESS NOTES
Sri Cornell is a 33 y.o. female      Chief Complaint   Patient presents with    Labs Only     Patent was gong to come in for labs to check hormones but want to wait until next Tuesday. No other concerns.        \"Have you been to the ER, urgent care clinic since your last visit?  Hospitalized since your last visit?\"    NO    “Have you seen or consulted any other health care providers outside of [facility] since your last visit?”    NO              Vitals:    06/05/24 0755   BP: 117/76   Site: Right Upper Arm   Position: Sitting   Pulse: 81   Resp: 18   Temp: 98.9 °F (37.2 °C)   TempSrc: Oral   SpO2: 97%   Weight: 62.6 kg (138 lb)   Height: 1.575 m (5' 2\")            Health Maintenance Due   Topic Date Due    Hepatitis B vaccine (1 of 3 - 3-dose series) Never done    COVID-19 Vaccine (1) Never done    Varicella vaccine (1 of 2 - 2-dose childhood series) Never done         Medication Reconciliation completed, changes noted.  Please  Update medication list.

## 2024-06-05 NOTE — PROGRESS NOTES
[facility] Office Visit     Assessment/ Plan: Sri Cornell is a 33 y.o. female presenting for:    Hypothyroidism - Acute on chronic. Symptoms well-controlled at this point. Dosing based on prior adjustments by prior PCP. TSH often low, will repeat labs and ensure T4 in normal range.     30 minutes were spent on the day of this encounter both with the patient and in related activities including chart review, care coordination and counseling.     Patient instructions were discussed and/or provided in the AVS. The patient understands and agrees to the plan.    RETURN TO CARE: 6 months   No future appointments.      Subjective:  Chief Complaint   Patient presents with    Labs Only     Patent was gong to come in for labs to check hormones but want to wait until next Tuesday. No other concerns.      HPI:   Sri Cornell is a 33 y.o. female with PMH of hypothyroid, family history of breast cancer here for follow-up.     - cycles have been pretty normal  28-29days, normal  - sleep - okay just 9-month old still waking up   - physical activity - trying to increase   - diet - healthy, trying to avoid carbs   - send through [employer]     I have reviewed the patients problem list, current medications, allergies, family, medical and social history. I have updated them as needed.     Review of Systems  See HPI.    Objective:  /76 (Site: Right Upper Arm, Position: Sitting)   Pulse 81   Temp 98.9 °F (37.2 °C) (Oral)   Resp 18   Ht 1.575 m (5' 2\")   Wt 62.6 kg (138 lb)   SpO2 97%   BMI 25.24 kg/m²   Physical Exam  Vitals and nursing note reviewed.   Constitutional:       General: She is not in acute distress.     Appearance: Normal appearance.   HENT:      Head: Normocephalic and atraumatic.      Right Ear: External ear normal.      Left Ear: External ear normal.      Mouth/Throat:      Mouth: Mucous membranes are moist.   Eyes:      Extraocular Movements: Extraocular movements intact.

## 2024-06-12 LAB
ESTRADIOL SERPL-MCNC: 142 PG/ML
PROGEST SERPL-MCNC: 11 NG/ML
PROLACTIN SERPL-MCNC: 21.7 NG/ML (ref 4.8–33.4)
T3FREE SERPL-MCNC: 4.6 PG/ML (ref 2–4.4)
T4 FREE SERPL-MCNC: 1.4 NG/DL (ref 0.82–1.77)
T4 SERPL-MCNC: 7.9 UG/DL (ref 4.5–12)
TSH SERPL DL<=0.005 MIU/L-ACNC: <0.005 UIU/ML (ref 0.45–4.5)

## 2024-06-12 RX ORDER — LEVOTHYROXINE, LIOTHYRONINE 9.5; 2.25 UG/1; UG/1
30 TABLET ORAL DAILY
Qty: 180 TABLET | Refills: 3 | Status: SHIPPED | OUTPATIENT
Start: 2024-06-12

## 2024-06-12 RX ORDER — LEVOTHYROXINE, LIOTHYRONINE 76; 18 UG/1; UG/1
120 TABLET ORAL DAILY
Qty: 90 TABLET | Refills: 3 | Status: SHIPPED | OUTPATIENT
Start: 2024-06-12

## 2024-06-13 LAB
SHBG SERPL-SCNC: 133 NMOL/L (ref 24.6–122)
TESTOST SERPL-MCNC: 19 NG/DL (ref 8–60)
TESTOSTERONE.FREE+WB MFR SERPL: 8.7 % (ref 3–18)
TESTOSTERONE.FREE+WB SERPL-MCNC: 1.7 NG/DL (ref 0–9.5)

## 2024-06-17 LAB — DHEA SERPL-MCNC: 207 NG/DL (ref 31–701)

## 2024-06-18 LAB — T3REVERSE SERPL-MCNC: 20.5 NG/DL (ref 9.2–24.1)

## 2024-11-20 ENCOUNTER — APPOINTMENT (OUTPATIENT)
Facility: HOSPITAL | Age: 34
End: 2024-11-20
Payer: COMMERCIAL

## 2024-11-20 ENCOUNTER — HOSPITAL ENCOUNTER (EMERGENCY)
Facility: HOSPITAL | Age: 34
Discharge: HOME | End: 2024-11-20
Attending: EMERGENCY MEDICINE
Payer: COMMERCIAL

## 2024-11-20 VITALS
OXYGEN SATURATION: 100 % | SYSTOLIC BLOOD PRESSURE: 121 MMHG | RESPIRATION RATE: 17 BRPM | DIASTOLIC BLOOD PRESSURE: 77 MMHG | WEIGHT: 135 LBS | HEIGHT: 62 IN | TEMPERATURE: 98.4 F | HEART RATE: 94 BPM | BODY MASS INDEX: 24.84 KG/M2

## 2024-11-20 DIAGNOSIS — R00.0 TACHYCARDIA: ICD-10-CM

## 2024-11-20 DIAGNOSIS — R07.9 NONSPECIFIC CHEST PAIN: Primary | ICD-10-CM

## 2024-11-20 LAB
ALBUMIN SERPL-MCNC: 4.2 G/DL (ref 3.5–5)
ALBUMIN/GLOB SERPL: 1 (ref 1.1–2.2)
ALP SERPL-CCNC: 106 U/L (ref 45–117)
ALT SERPL-CCNC: 26 U/L (ref 12–78)
ANION GAP SERPL CALC-SCNC: 12 MMOL/L (ref 2–12)
AST SERPL-CCNC: 14 U/L (ref 15–37)
BASOPHILS # BLD: 0.1 K/UL (ref 0–0.1)
BASOPHILS NFR BLD: 1 % (ref 0–1)
BILIRUB SERPL-MCNC: 0.5 MG/DL (ref 0.2–1)
BUN SERPL-MCNC: 15 MG/DL (ref 6–20)
BUN/CREAT SERPL: 22 (ref 12–20)
CALCIUM SERPL-MCNC: 9.7 MG/DL (ref 8.5–10.1)
CHLORIDE SERPL-SCNC: 104 MMOL/L (ref 97–108)
CO2 SERPL-SCNC: 24 MMOL/L (ref 21–32)
COMMENT:: NORMAL
CREAT SERPL-MCNC: 0.67 MG/DL (ref 0.55–1.02)
DIFFERENTIAL METHOD BLD: NORMAL
EOSINOPHIL # BLD: 0.1 K/UL (ref 0–0.4)
EOSINOPHIL NFR BLD: 1 % (ref 0–7)
ERYTHROCYTE [DISTWIDTH] IN BLOOD BY AUTOMATED COUNT: 13.2 % (ref 11.5–14.5)
GLOBULIN SER CALC-MCNC: 4.1 G/DL (ref 2–4)
GLUCOSE SERPL-MCNC: 101 MG/DL (ref 65–100)
HCT VFR BLD AUTO: 40.2 % (ref 35–47)
HGB BLD-MCNC: 13.5 G/DL (ref 11.5–16)
IMM GRANULOCYTES # BLD AUTO: 0 K/UL (ref 0–0.04)
IMM GRANULOCYTES NFR BLD AUTO: 0 % (ref 0–0.5)
LYMPHOCYTES # BLD: 2.5 K/UL (ref 0.8–3.5)
LYMPHOCYTES NFR BLD: 33 % (ref 12–49)
MCH RBC QN AUTO: 30.8 PG (ref 26–34)
MCHC RBC AUTO-ENTMCNC: 33.6 G/DL (ref 30–36.5)
MCV RBC AUTO: 91.6 FL (ref 80–99)
MONOCYTES # BLD: 0.5 K/UL (ref 0–1)
MONOCYTES NFR BLD: 6 % (ref 5–13)
NEUTS SEG # BLD: 4.4 K/UL (ref 1.8–8)
NEUTS SEG NFR BLD: 59 % (ref 32–75)
NRBC # BLD: 0 K/UL (ref 0–0.01)
NRBC BLD-RTO: 0 PER 100 WBC
PLATELET # BLD AUTO: 253 K/UL (ref 150–400)
PMV BLD AUTO: 10.3 FL (ref 8.9–12.9)
POTASSIUM SERPL-SCNC: 4.2 MMOL/L (ref 3.5–5.1)
PROT SERPL-MCNC: 8.3 G/DL (ref 6.4–8.2)
RBC # BLD AUTO: 4.39 M/UL (ref 3.8–5.2)
SODIUM SERPL-SCNC: 140 MMOL/L (ref 136–145)
SPECIMEN HOLD: NORMAL
TROPONIN I SERPL HS-MCNC: 7 NG/L (ref 0–51)
WBC # BLD AUTO: 7.5 K/UL (ref 3.6–11)

## 2024-11-20 PROCEDURE — 80053 COMPREHEN METABOLIC PANEL: CPT

## 2024-11-20 PROCEDURE — 93005 ELECTROCARDIOGRAM TRACING: CPT | Performed by: EMERGENCY MEDICINE

## 2024-11-20 PROCEDURE — 84484 ASSAY OF TROPONIN QUANT: CPT

## 2024-11-20 PROCEDURE — 85025 COMPLETE CBC W/AUTO DIFF WBC: CPT

## 2024-11-20 PROCEDURE — 71275 CT ANGIOGRAPHY CHEST: CPT

## 2024-11-20 PROCEDURE — 6360000004 HC RX CONTRAST MEDICATION: Performed by: EMERGENCY MEDICINE

## 2024-11-20 PROCEDURE — 99285 EMERGENCY DEPT VISIT HI MDM: CPT

## 2024-11-20 PROCEDURE — 36415 COLL VENOUS BLD VENIPUNCTURE: CPT

## 2024-11-20 RX ORDER — PANTOPRAZOLE SODIUM 40 MG/1
40 TABLET, DELAYED RELEASE ORAL
Qty: 90 TABLET | Refills: 0 | Status: SHIPPED | OUTPATIENT
Start: 2024-11-20

## 2024-11-20 RX ORDER — IOPAMIDOL 755 MG/ML
100 INJECTION, SOLUTION INTRAVASCULAR
Status: COMPLETED | OUTPATIENT
Start: 2024-11-20 | End: 2024-11-20

## 2024-11-20 RX ADMIN — IOPAMIDOL 100 ML: 755 INJECTION, SOLUTION INTRAVENOUS at 14:10

## 2024-11-20 ASSESSMENT — PAIN - FUNCTIONAL ASSESSMENT: PAIN_FUNCTIONAL_ASSESSMENT: 0-10

## 2024-11-20 ASSESSMENT — ENCOUNTER SYMPTOMS
SHORTNESS OF BREATH: 0
NAUSEA: 0
CHEST TIGHTNESS: 0
BACK PAIN: 0
DIARRHEA: 0
EYE DISCHARGE: 0
EYE PAIN: 0
VOMITING: 0
COUGH: 0
ABDOMINAL PAIN: 0
SORE THROAT: 0
FACIAL SWELLING: 0

## 2024-11-20 ASSESSMENT — PAIN SCALES - GENERAL: PAINLEVEL_OUTOF10: 6

## 2024-11-20 ASSESSMENT — PAIN DESCRIPTION - DESCRIPTORS: DESCRIPTORS: SHARP

## 2024-11-20 ASSESSMENT — PAIN DESCRIPTION - PAIN TYPE: TYPE: ACUTE PAIN

## 2024-11-20 ASSESSMENT — PAIN DESCRIPTION - ORIENTATION: ORIENTATION: MID;UPPER

## 2024-11-20 ASSESSMENT — PAIN DESCRIPTION - LOCATION: LOCATION: CHEST

## 2024-11-20 NOTE — ED NOTES
The patient was discharged home by Dr Alfaro  in stable condition. The patient is alert and oriented, in no respiratory distress and discharge vital signs obtained. The patient's diagnosis, condition and treatment were explained. The patient expressed understanding. Prescriptions given/e-scribed to pharmacy. No work/school note given. A discharge plan has been developed. A  was not involved in the process. Aftercare instructions were given.  Pt ambulatory out of the ED with family.

## 2024-11-20 NOTE — ED PROVIDER NOTES
Breast Cancer Father 55    Thyroid Disease Sister     Heart Disease Maternal Grandmother     COPD Maternal Grandmother     Heart Disease Maternal Grandfather     Colon Cancer Paternal Grandfather 53          SOCIAL HISTORY       Social History     Socioeconomic History    Marital status:      Spouse name: Manish    Number of children: 1   Occupational History    Occupation:    Tobacco Use    Smoking status: Never    Smokeless tobacco: Never   Vaping Use    Vaping status: Never Used   Substance and Sexual Activity    Alcohol use: Yes     Comment: socially    Drug use: Never    Sexual activity: Yes     Partners: Male     Birth control/protection: None     Social Determinants of Health     Intimate Partner Violence: Not At Risk (1/27/2023)    Humiliation, Afraid, Rape, and Kick questionnaire     Fear of Current or Ex-Partner: No     Emotionally Abused: No     Physically Abused: No     Sexually Abused: No           PHYSICAL EXAM    (up to 7 for level 4, 8 or more for level 5)     ED Triage Vitals   BP Systolic BP Percentile Diastolic BP Percentile Temp Temp src Pulse Resp SpO2   -- -- -- -- -- -- -- --      Height Weight         -- --             Body mass index is 24.69 kg/m².    Physical Exam  Vitals and nursing note reviewed.   Constitutional:       Appearance: She is normal weight.   HENT:      Head: Normocephalic and atraumatic.   Eyes:      Extraocular Movements: Extraocular movements intact.      Conjunctiva/sclera: Conjunctivae normal.      Pupils: Pupils are equal, round, and reactive to light.   Cardiovascular:      Rate and Rhythm: Normal rate and regular rhythm.      Pulses: Normal pulses.      Heart sounds: Normal heart sounds. No murmur heard.  Pulmonary:      Effort: Pulmonary effort is normal. No respiratory distress.      Breath sounds: No stridor. No wheezing.   Abdominal:      General: Abdomen is flat. Bowel sounds are normal. There is no distension.      Palpations: Abdomen is

## 2024-11-20 NOTE — ED NOTES
ED Course as of 11/20/24 1544   Wed Nov 20, 2024   1408 EKG at 1232 read at 1242 shows sinus tachycardia 111 bpm with no ST elevations. [VM]   1512 Signout  24f here with cp and epigastric pain. Thinks it's GERD.   Pending CTPE  Likely discharge. [AS]   1538 CTA CHEST W WO CONTRAST PE Eval  IMPRESSION:     No acute PE   [AS]      ED Course User Index  [AS] Isaias Alfaro MD  [VM] Derik Josue MD     Patient reassessed at this time.  Feeling well without any symptoms at this time.  Is describing a spasm sensation symptoms in her neck, sometimes in her lower chest.  She states it is related to certain food intake, as well as stress.  She is mildly tachycardic here, it seems to worsen when I walk in the room and after a moment of speaking with her it will decrease down into the low 100s.  Her workup is overall benign with no elevated troponin, no significant metabolic disarray.  CT is negative for PE, hernia, pneumothorax, pneumonia or any other emergency condition.  I suspect gastric or esophageal related symptoms.  Will prescribe Protonix for symptom control.  Give strict return precautions and recommend GI follow-up for further care.     Isaias Alfaro MD  11/20/24 1543

## 2024-11-20 NOTE — DISCHARGE INSTRUCTIONS
Try to prescribed medication.  Return to the ER symptoms change or worsen or new symptoms arise.  Follow-up with the GI doctors as soon as possible if symptoms do not improve.

## 2024-11-20 NOTE — ED NOTES
CT reports patients IV would not flush proper for exam, went to evaluate, CT staff able to redstart another IV in [facility].

## 2024-11-21 LAB
EKG ATRIAL RATE: 111 BPM
EKG DIAGNOSIS: NORMAL
EKG P AXIS: 67 DEGREES
EKG P-R INTERVAL: 168 MS
EKG Q-T INTERVAL: 298 MS
EKG QRS DURATION: 86 MS
EKG QTC CALCULATION (BAZETT): 405 MS
EKG R AXIS: 50 DEGREES
EKG T AXIS: 8 DEGREES
EKG VENTRICULAR RATE: 111 BPM

## 2025-01-12 DIAGNOSIS — E03.9 HYPOTHYROIDISM, UNSPECIFIED TYPE: ICD-10-CM

## 2025-01-13 DIAGNOSIS — E03.9 HYPOTHYROIDISM, UNSPECIFIED TYPE: ICD-10-CM

## 2025-01-13 NOTE — TELEPHONE ENCOUNTER
Medication Refill Request    Sri Cornell is requesting a refill of the following medication(s):   Requested Prescriptions     Pending Prescriptions Disp Refills    NP THYROID 120 MG tablet [Pharmacy Med Name: NP THYROID 120 MG TAB] 90 tablet 3     Sig: TAKE ONE TABLET BY MOUTH ONE TIME DAILY        Listed PCP is Mandy De Santiago, DO   Last provider to prescribe medication: Jonnathan  Last Date of Medication Prescribed: 06/12/2024   Date of Last Office Visit at [facility]: 06/05/2024  FUTURE APPOINTMENT: No future appointments.    Please send refill to:    [id number] - [place], [place] - [address] -  [phone] - F [phone]  [address] [facility] [address]  Phone: [phone] Fax: [phone]    Please review request and approve or deny with recommendations.

## 2025-01-13 NOTE — TELEPHONE ENCOUNTER
Medication Refill Request    Sri Cornell is requesting a refill of the following medication(s):   Requested Prescriptions     Pending Prescriptions Disp Refills    NP THYROID 15 MG tablet [Pharmacy Med Name: NP THYROID 15 MG TABLET] 180 tablet 3     Sig: TAKE TWO TABLETS BY MOUTH ONE TIME DAILY        Listed PCP is Mandy De Santiago, DO   Last provider to prescribe medication: Jonnathan  Last Date of Medication Prescribed: 06/12/2024  Date of Last Office Visit at [facility]: 06/05/2024  FUTURE APPOINTMENT: No future appointments.    Please send refill to:    [id number] - [place], [place] - [address] -  [phone] - F [phone]  [address] [facility] [address] [address]  Phone: [phone] Fax: [phone]  Please review request and approve or deny with recommendations.

## 2025-01-14 RX ORDER — LEVOTHYROXINE, LIOTHYRONINE 76; 18 UG/1; UG/1
120 TABLET ORAL DAILY
Qty: 90 TABLET | Refills: 1 | Status: SHIPPED | OUTPATIENT
Start: 2025-01-14

## 2025-01-14 RX ORDER — LEVOTHYROXINE, LIOTHYRONINE 9.5; 2.25 UG/1; UG/1
TABLET ORAL
Qty: 30 TABLET | Refills: 5 | Status: SHIPPED | OUTPATIENT
Start: 2025-01-14

## 2025-03-07 PROBLEM — K21.9 GASTROESOPHAGEAL REFLUX DISEASE WITHOUT ESOPHAGITIS: Status: ACTIVE | Noted: 2025-03-07

## 2025-05-10 ENCOUNTER — OFFICE VISIT (OUTPATIENT)
Age: 35
End: 2025-05-10

## 2025-05-10 VITALS
WEIGHT: 128 LBS | HEART RATE: 70 BPM | DIASTOLIC BLOOD PRESSURE: 84 MMHG | BODY MASS INDEX: 23.55 KG/M2 | OXYGEN SATURATION: 99 % | SYSTOLIC BLOOD PRESSURE: 138 MMHG | HEIGHT: 62 IN | TEMPERATURE: 98 F | RESPIRATION RATE: 16 BRPM

## 2025-05-10 DIAGNOSIS — T23.209A SUPERFICIAL PARTIAL THICKNESS BURN OF HAND: Primary | ICD-10-CM

## 2025-05-10 DIAGNOSIS — R03.0 ELEVATED BLOOD PRESSURE READING: ICD-10-CM

## 2025-05-10 RX ORDER — VITAMIN B COMPLEX
1 CAPSULE ORAL DAILY
COMMUNITY

## 2025-05-10 RX ORDER — CEPHALEXIN 500 MG/1
500 CAPSULE ORAL 2 TIMES DAILY
Qty: 10 CAPSULE | Refills: 0 | Status: SHIPPED | OUTPATIENT
Start: 2025-05-10 | End: 2025-05-15

## 2025-05-10 RX ORDER — LACTOBACILLUS RHAMNOSUS GG 10B CELL
1 CAPSULE ORAL DAILY
COMMUNITY

## 2025-05-10 RX ORDER — MUPIROCIN 20 MG/G
OINTMENT TOPICAL 2 TIMES DAILY
Qty: 1 EACH | Refills: 0 | Status: SHIPPED | OUTPATIENT
Start: 2025-05-10 | End: 2025-05-17

## 2025-05-10 RX ORDER — KETOROLAC TROMETHAMINE 30 MG/ML
30 INJECTION, SOLUTION INTRAMUSCULAR; INTRAVENOUS ONCE
Status: COMPLETED | OUTPATIENT
Start: 2025-05-10 | End: 2025-05-10

## 2025-05-10 RX ORDER — SILVER SULFADIAZINE 10 MG/G
CREAM TOPICAL
Qty: 50 G | Refills: 0 | Status: SHIPPED | OUTPATIENT
Start: 2025-05-10

## 2025-05-10 RX ORDER — LACTULOSE 10 G/15ML
SOLUTION ORAL
COMMUNITY
Start: 2025-01-22

## 2025-05-10 RX ADMIN — KETOROLAC TROMETHAMINE 30 MG: 30 INJECTION, SOLUTION INTRAMUSCULAR; INTRAVENOUS at 15:16

## 2025-05-10 ASSESSMENT — ENCOUNTER SYMPTOMS
RESPIRATORY NEGATIVE: 1
EYES NEGATIVE: 1
GASTROINTESTINAL NEGATIVE: 1
ALLERGIC/IMMUNOLOGIC NEGATIVE: 1

## 2025-05-10 NOTE — PROGRESS NOTES
5/10/2025   Sri Cornell (: 1990) is a 34 y.o. female, patient, here for evaluation of the following chief complaint(s):  No chief complaint on file.       SUBJECTIVE/OBJECTIVE:  34-year-old female presents for burn to right hand after burning herself with her curling iron accidentally.  Patient reports applying hand under cool water prior to arrival.  Patient reports tetanus is up-to-date.      History provided by:  Patient        Review of Systems   Constitutional: Negative.    HENT: Negative.     Eyes: Negative.    Respiratory: Negative.     Cardiovascular: Negative.    Gastrointestinal: Negative.    Endocrine: Negative.    Genitourinary: Negative.    Musculoskeletal: Negative.    Skin:  Positive for wound.   Allergic/Immunologic: Negative.    Neurological: Negative.    Hematological: Negative.    Psychiatric/Behavioral: Negative.     All other systems reviewed and are negative.        Physical Exam  Vitals and nursing note reviewed.   Constitutional:       General: She is not in acute distress.     Appearance: Normal appearance. She is not ill-appearing or toxic-appearing.   HENT:      Head: Normocephalic.      Right Ear: Tympanic membrane normal.      Left Ear: Tympanic membrane normal.      Nose: Nose normal.      Mouth/Throat:      Mouth: Mucous membranes are moist.      Pharynx: Oropharynx is clear.   Eyes:      Extraocular Movements: Extraocular movements intact.      Pupils: Pupils are equal, round, and reactive to light.   Cardiovascular:      Rate and Rhythm: Normal rate and regular rhythm.   Pulmonary:      Effort: No respiratory distress.      Breath sounds: Normal breath sounds. No stridor. No wheezing, rhonchi or rales.   Abdominal:      General: Abdomen is flat. There is no distension.      Palpations: Abdomen is soft.      Tenderness: There is no abdominal tenderness. There is no right CVA tenderness, left CVA tenderness, guarding or rebound.      Hernia: No hernia is

## 2025-05-10 NOTE — PATIENT INSTRUCTIONS
[facility]   [address] [place] [address]   For appointments, please call [phone]    Please follow up with burn/wound clinic as soon as possible        Learning About Being Physically Active  What is physical activity?     Being physically active means doing any kind of activity that gets your body moving.  The types of physical activity that can help you get fit and stay healthy include:  Aerobic or \"cardio\" activities. These make your heart beat faster and make you breathe harder, such as brisk walking, riding a bike, or running. They strengthen your heart and lungs and build up your endurance.  Strength training activities. These make your muscles work against, or \"resist,\" something. Examples include lifting weights or doing push-ups. These activities help tone and strengthen your muscles and bones.  Stretches. These let you move your joints and muscles through their full range of motion. Stretching helps you be more flexible.  Reaching a balance between these three types of physical activity is important because each one contributes to your overall fitness.  What are the benefits of being active?  Being active is one of the best things you can do for your health. It helps you to:  Feel stronger and have more energy to do all the things you like to do.  Focus better at school or work.  Feel, think, and sleep better.  Reach and stay at a healthy weight.  Lose fat and build lean muscle.  Lower your risk for serious health problems, including diabetes, heart attack, high blood pressure, and some cancers.  Keep your heart, lungs, bones, muscles, and joints strong and healthy.  How can you make being active part of your life?  Start slowly. Make it your long-term goal to get at least 30 minutes of exercise on most days of the week. Walking is a good choice. You also may want to do other activities, such as running, swimming, cycling, or playing tennis or team sports.  Pick activities that you

## 2025-06-01 ENCOUNTER — TRANSCRIBE ORDERS (OUTPATIENT)
Facility: HOSPITAL | Age: 35
End: 2025-06-01

## 2025-06-01 DIAGNOSIS — K29.70 GASTRITIS WITHOUT BLEEDING, UNSPECIFIED CHRONICITY, UNSPECIFIED GASTRITIS TYPE: Primary | ICD-10-CM

## 2025-06-17 ENCOUNTER — HOSPITAL ENCOUNTER (OUTPATIENT)
Facility: HOSPITAL | Age: 35
Discharge: HOME | End: 2025-06-20
Payer: COMMERCIAL

## 2025-06-17 DIAGNOSIS — E03.9 HYPOTHYROIDISM, ADULT: ICD-10-CM

## 2025-06-17 DIAGNOSIS — K29.70 GASTRITIS WITHOUT BLEEDING, UNSPECIFIED CHRONICITY, UNSPECIFIED GASTRITIS TYPE: ICD-10-CM

## 2025-06-17 PROCEDURE — 76700 US EXAM ABDOM COMPLETE: CPT

## 2025-06-17 PROCEDURE — 76536 US EXAM OF HEAD AND NECK: CPT

## 2025-08-20 ENCOUNTER — OFFICE VISIT (OUTPATIENT)
Age: 35
End: 2025-08-20

## 2025-08-20 VITALS
HEART RATE: 76 BPM | WEIGHT: 128 LBS | DIASTOLIC BLOOD PRESSURE: 79 MMHG | BODY MASS INDEX: 23.55 KG/M2 | RESPIRATION RATE: 16 BRPM | SYSTOLIC BLOOD PRESSURE: 120 MMHG | HEIGHT: 62 IN | OXYGEN SATURATION: 99 % | TEMPERATURE: 98.3 F

## 2025-08-20 DIAGNOSIS — L72.3 SEBACEOUS CYST OF LEFT AXILLA: Primary | ICD-10-CM

## 2025-08-20 DIAGNOSIS — R39.9 UTI SYMPTOMS: ICD-10-CM

## 2025-08-20 LAB
BILIRUBIN, URINE, POC: NEGATIVE
BLOOD URINE, POC: NEGATIVE
GLUCOSE URINE, POC: NEGATIVE
HCG, PREGNANCY, URINE, POC: NEGATIVE
KETONES, URINE, POC: NEGATIVE
LEUKOCYTE ESTERASE, URINE, POC: NEGATIVE
NITRITE, URINE, POC: NEGATIVE
PH, URINE, POC: 7 (ref 4.6–8)
PROTEIN,URINE, POC: NEGATIVE
SPECIFIC GRAVITY, URINE, POC: 1.02 (ref 1–1.03)
URINALYSIS CLARITY, POC: CLEAR
URINALYSIS COLOR, POC: YELLOW
UROBILINOGEN, POC: NORMAL
VALID INTERNAL CONTROL, POC: NORMAL

## 2025-08-20 RX ORDER — DOXYCYCLINE HYCLATE 100 MG
100 TABLET ORAL 2 TIMES DAILY
Qty: 14 TABLET | Refills: 0 | Status: SHIPPED | OUTPATIENT
Start: 2025-08-20 | End: 2025-08-27

## 2025-08-20 ASSESSMENT — ENCOUNTER SYMPTOMS
EYES NEGATIVE: 1
ALLERGIC/IMMUNOLOGIC NEGATIVE: 1
RESPIRATORY NEGATIVE: 1
GASTROINTESTINAL NEGATIVE: 1

## (undated) DEVICE — GLOVE SURG SZ 8 L12IN FNGR THK79MIL GRN LTX FREE

## (undated) DEVICE — SUTURE VCRL SZ 0 L36IN ABSRB VLT L40MM CT 1/2 CIR J358H

## (undated) DEVICE — C-SECTION II-LF: Brand: MEDLINE INDUSTRIES, INC.

## (undated) DEVICE — TRAY,URINE METER,100% SILICONE,16FR10ML: Brand: MEDLINE

## (undated) DEVICE — BLADE CLIPPER GEN PURP NS

## (undated) DEVICE — SUTURE VCRL SZ 2-0 L36IN ABSRB UD L36MM CT-1 1/2 CIR J945H

## (undated) DEVICE — PENCIL ES L3M ROCK SWCH S STL HEX LOK BLDE ELECTRD HOLSTER

## (undated) DEVICE — SPONGE: LAP 18X18 W  200/CS: Brand: MEDICAL ACTION INDUSTRIES

## (undated) DEVICE — SUTURE MCRYL + SZ 3-0 L27IN ABSRB UD L26MM SH 1/2 CIR MCP416H

## (undated) DEVICE — CONNEXT SURGICAL MATRIX - LARGE SYRINGE: Brand: CONNEXT SURGICAL MATRIX

## (undated) DEVICE — ELECTRODE PT RET AD L9FT HI MOIST COND ADH HYDRGEL CORDED

## (undated) DEVICE — STRAP,POSITIONING,KNEE/BODY,FOAM,4X60": Brand: MEDLINE

## (undated) DEVICE — GARMENT,MEDLINE,DVT,INT,CALF,MED, GEN2: Brand: MEDLINE

## (undated) DEVICE — SOLUTION IV 1000ML 0.9% SOD CHL

## (undated) DEVICE — TOWEL,OR,DSP,ST,BLUE,STD,2/PK,40PK/CS: Brand: MEDLINE

## (undated) DEVICE — CLEANER ES TIP W2XL2IN ADH BK RADPQ FOR S STL ELECTRD

## (undated) DEVICE — APPLICATOR MEDICATED 26 CC SOLUTION HI LT ORNG CHLORAPREP

## (undated) DEVICE — SOLIDIFIER FLUID 3000 CC ABSORB

## (undated) DEVICE — 3000CC GUARDIAN II: Brand: GUARDIAN

## (undated) DEVICE — INTENT OT USE PROVIDES A STERILE INTERFACE BETWEEN THE OPERATING ROOM SURGICAL LAMPS (NON-STERILE) AND THE SURGEON OR STAFF WORKING IN THE STERILE FIELD.: Brand: ASPEN® ALC PLUS LIGHT HANDLE COVER

## (undated) DEVICE — SYRINGE IRRIG 60ML SFT PLIABLE BLB EZ TO GRP 1 HND USE W/

## (undated) DEVICE — GOWN,SIRUS,FABRNF,XL,20/CS: Brand: MEDLINE